# Patient Record
Sex: FEMALE | Race: WHITE | Employment: FULL TIME | URBAN - METROPOLITAN AREA
[De-identification: names, ages, dates, MRNs, and addresses within clinical notes are randomized per-mention and may not be internally consistent; named-entity substitution may affect disease eponyms.]

---

## 2017-11-05 ENCOUNTER — HOSPITAL ENCOUNTER (EMERGENCY)
Age: 27
Discharge: HOME | End: 2017-11-05
Attending: EMERGENCY MEDICINE
Payer: SUBSIDIZED

## 2017-11-05 VITALS
RESPIRATION RATE: 16 BRPM | WEIGHT: 199.3 LBS | HEART RATE: 104 BPM | HEIGHT: 62 IN | DIASTOLIC BLOOD PRESSURE: 66 MMHG | BODY MASS INDEX: 36.67 KG/M2 | SYSTOLIC BLOOD PRESSURE: 100 MMHG | OXYGEN SATURATION: 99 % | TEMPERATURE: 98.1 F

## 2017-11-05 DIAGNOSIS — N30.00 ACUTE CYSTITIS WITHOUT HEMATURIA: Primary | ICD-10-CM

## 2017-11-05 LAB
ALBUMIN SERPL-MCNC: 3.3 G/DL (ref 3.5–5)
ALBUMIN/GLOB SERPL: 1 {RATIO} (ref 1.1–2.2)
ALP SERPL-CCNC: 102 U/L (ref 45–117)
ALT SERPL-CCNC: 94 U/L (ref 12–78)
ANION GAP SERPL CALC-SCNC: 6 MMOL/L (ref 5–15)
APPEARANCE UR: ABNORMAL
AST SERPL-CCNC: 61 U/L (ref 15–37)
BACTERIA URNS QL MICRO: ABNORMAL /HPF
BASOPHILS # BLD: 0 K/UL (ref 0–0.1)
BASOPHILS NFR BLD: 0 % (ref 0–1)
BILIRUB SERPL-MCNC: 0.2 MG/DL (ref 0.2–1)
BILIRUB UR QL: NEGATIVE
BUN SERPL-MCNC: 12 MG/DL (ref 6–20)
BUN/CREAT SERPL: 16 (ref 12–20)
CALCIUM SERPL-MCNC: 7.7 MG/DL (ref 8.5–10.1)
CHLORIDE SERPL-SCNC: 107 MMOL/L (ref 97–108)
CO2 SERPL-SCNC: 25 MMOL/L (ref 21–32)
COLOR UR: ABNORMAL
CREAT SERPL-MCNC: 0.77 MG/DL (ref 0.55–1.02)
EOSINOPHIL # BLD: 0.2 K/UL (ref 0–0.4)
EOSINOPHIL NFR BLD: 3 % (ref 0–7)
EPITH CASTS URNS QL MICRO: ABNORMAL /LPF
ERYTHROCYTE [DISTWIDTH] IN BLOOD BY AUTOMATED COUNT: 14 % (ref 11.5–14.5)
GLOBULIN SER CALC-MCNC: 3.2 G/DL (ref 2–4)
GLUCOSE SERPL-MCNC: 88 MG/DL (ref 65–100)
GLUCOSE UR STRIP.AUTO-MCNC: NEGATIVE MG/DL
HCG UR QL: NEGATIVE
HCT VFR BLD AUTO: 39.7 % (ref 35–47)
HGB BLD-MCNC: 13.2 G/DL (ref 11.5–16)
HGB UR QL STRIP: ABNORMAL
KETONES UR QL STRIP.AUTO: NEGATIVE MG/DL
LEUKOCYTE ESTERASE UR QL STRIP.AUTO: ABNORMAL
LYMPHOCYTES # BLD: 0.3 K/UL (ref 0.8–3.5)
LYMPHOCYTES NFR BLD: 6 % (ref 12–49)
MCH RBC QN AUTO: 29.9 PG (ref 26–34)
MCHC RBC AUTO-ENTMCNC: 33.2 G/DL (ref 30–36.5)
MCV RBC AUTO: 90 FL (ref 80–99)
MONOCYTES # BLD: 0.5 K/UL (ref 0–1)
MONOCYTES NFR BLD: 8 % (ref 5–13)
NEUTS SEG # BLD: 4.8 K/UL (ref 1.8–8)
NEUTS SEG NFR BLD: 83 % (ref 32–75)
NITRITE UR QL STRIP.AUTO: NEGATIVE
PH UR STRIP: 6 [PH] (ref 5–8)
PLATELET # BLD AUTO: 186 K/UL (ref 150–400)
POTASSIUM SERPL-SCNC: 4.1 MMOL/L (ref 3.5–5.1)
PROT SERPL-MCNC: 6.5 G/DL (ref 6.4–8.2)
PROT UR STRIP-MCNC: NEGATIVE MG/DL
RBC # BLD AUTO: 4.41 M/UL (ref 3.8–5.2)
RBC #/AREA URNS HPF: ABNORMAL /HPF (ref 0–5)
RBC MORPH BLD: ABNORMAL
SODIUM SERPL-SCNC: 138 MMOL/L (ref 136–145)
SP GR UR REFRACTOMETRY: 1.02 (ref 1–1.03)
UA: UC IF INDICATED,UAUC: ABNORMAL
UROBILINOGEN UR QL STRIP.AUTO: 1 EU/DL (ref 0.2–1)
WBC # BLD AUTO: 5.8 K/UL (ref 3.6–11)
WBC URNS QL MICRO: ABNORMAL /HPF (ref 0–4)

## 2017-11-05 PROCEDURE — 80053 COMPREHEN METABOLIC PANEL: CPT | Performed by: EMERGENCY MEDICINE

## 2017-11-05 PROCEDURE — 99284 EMERGENCY DEPT VISIT MOD MDM: CPT

## 2017-11-05 PROCEDURE — 87086 URINE CULTURE/COLONY COUNT: CPT | Performed by: EMERGENCY MEDICINE

## 2017-11-05 PROCEDURE — 96361 HYDRATE IV INFUSION ADD-ON: CPT

## 2017-11-05 PROCEDURE — 85025 COMPLETE CBC W/AUTO DIFF WBC: CPT | Performed by: EMERGENCY MEDICINE

## 2017-11-05 PROCEDURE — 96375 TX/PRO/DX INJ NEW DRUG ADDON: CPT

## 2017-11-05 PROCEDURE — 36415 COLL VENOUS BLD VENIPUNCTURE: CPT | Performed by: EMERGENCY MEDICINE

## 2017-11-05 PROCEDURE — 74011000258 HC RX REV CODE- 258: Performed by: EMERGENCY MEDICINE

## 2017-11-05 PROCEDURE — 81025 URINE PREGNANCY TEST: CPT | Performed by: EMERGENCY MEDICINE

## 2017-11-05 PROCEDURE — 96365 THER/PROPH/DIAG IV INF INIT: CPT

## 2017-11-05 PROCEDURE — 74011250636 HC RX REV CODE- 250/636: Performed by: EMERGENCY MEDICINE

## 2017-11-05 PROCEDURE — 81001 URINALYSIS AUTO W/SCOPE: CPT | Performed by: EMERGENCY MEDICINE

## 2017-11-05 PROCEDURE — 74011250637 HC RX REV CODE- 250/637: Performed by: EMERGENCY MEDICINE

## 2017-11-05 RX ORDER — KETOROLAC TROMETHAMINE 30 MG/ML
15 INJECTION, SOLUTION INTRAMUSCULAR; INTRAVENOUS
Status: COMPLETED | OUTPATIENT
Start: 2017-11-05 | End: 2017-11-05

## 2017-11-05 RX ORDER — CLONIDINE HYDROCHLORIDE 0.1 MG/1
0.1 TABLET ORAL AS NEEDED
COMMUNITY
End: 2019-01-14 | Stop reason: SDUPTHER

## 2017-11-05 RX ORDER — LAMOTRIGINE 200 MG/1
300 TABLET ORAL DAILY
COMMUNITY
End: 2018-03-29 | Stop reason: SDUPTHER

## 2017-11-05 RX ORDER — PHENAZOPYRIDINE HYDROCHLORIDE 100 MG/1
200 TABLET, FILM COATED ORAL
Status: COMPLETED | OUTPATIENT
Start: 2017-11-05 | End: 2017-11-05

## 2017-11-05 RX ORDER — ONDANSETRON 2 MG/ML
4 INJECTION INTRAMUSCULAR; INTRAVENOUS
Status: COMPLETED | OUTPATIENT
Start: 2017-11-05 | End: 2017-11-05

## 2017-11-05 RX ORDER — CEPHALEXIN 500 MG/1
500 CAPSULE ORAL 3 TIMES DAILY
Qty: 21 CAP | Refills: 0 | Status: SHIPPED | OUTPATIENT
Start: 2017-11-05 | End: 2017-11-12

## 2017-11-05 RX ADMIN — SODIUM CHLORIDE 1000 ML: 900 INJECTION, SOLUTION INTRAVENOUS at 10:40

## 2017-11-05 RX ADMIN — SODIUM CHLORIDE 1000 ML: 900 INJECTION, SOLUTION INTRAVENOUS at 11:35

## 2017-11-05 RX ADMIN — PHENAZOPYRIDINE HYDROCHLORIDE 200 MG: 100 TABLET ORAL at 10:46

## 2017-11-05 RX ADMIN — KETOROLAC TROMETHAMINE 15 MG: 30 INJECTION, SOLUTION INTRAMUSCULAR at 10:47

## 2017-11-05 RX ADMIN — ONDANSETRON HYDROCHLORIDE 4 MG: 2 INJECTION, SOLUTION INTRAMUSCULAR; INTRAVENOUS at 11:15

## 2017-11-05 RX ADMIN — CEFTRIAXONE 1 G: 1 INJECTION, POWDER, FOR SOLUTION INTRAMUSCULAR; INTRAVENOUS at 11:46

## 2017-11-05 NOTE — DISCHARGE INSTRUCTIONS

## 2017-11-05 NOTE — ED NOTES
Bedside shift change report given to [address] (oncoming nurse) by Rod Persaud RN (offgoing nurse). Report included the following information SBAR, ED Summary and MAR.

## 2017-11-05 NOTE — ED NOTES
Patient received discharge instructions and prescriptions from MD; KOBI; neuro intact; received work note x 2 days; ambulated from ED under own power accompanied by family; declined wheelchair assistance to [employer]

## 2017-11-05 NOTE — ED PROVIDER NOTES
[facility].  EMERGENCY DEPARTMENT HISTORY AND PHYSICAL EXAM       Date of Service: 11/5/2017   Patient Name: Lilli Young   YOB: 1990  Medical Record Number: [id number]    History of Presenting Illness     Chief Complaint   Patient presents with    Urinary Pain     pt treated for UTI since Thursday on Bactrim without relief of symptoms, pt reports feeling worse        History Provided By:  patient    Additional History:   Lilli Young is a 28 y.o. female with PMhx significant for CKD, kidney stones, and UTI who presents w/ mother to the ED with cc of 5/10 constant aching lower abdominal and back pain. Pt additionally complains of reoccurring fever, nausea, vomiting, urinary frequency, chills, diarrhea, and generalized body aches since onset of pain. Pt reports she was treated for a UTI on Thursday (11/2/17) at [facility] and was prescribed a short course of Bactrim of which she took last night (11/4/17) without any relief of pain and symptoms. She denies any chance of pregnancy and her LMP was on 10/2/17. Pt denies dysuria, hematuria, vaginal discharge, lightheadedness, dizziness, chest pain, dyspnea. Social Hx: - Tobacco (former), - EtOH, - Illicit Drugs    There are no other complaints, changes or physical findings at this time. Primary Care Provider: Carmel Madera MD     Past History     Past Medical History:   Past Medical History:   Diagnosis Date    Chronic kidney disease     UTI, kidney stones        Past Surgical History:   History reviewed. No pertinent surgical history. Family History:   History reviewed. No pertinent family history. Social History:   Social History   Substance Use Topics    Smoking status: Former Smoker     Quit date: 12/1/2012    Smokeless tobacco: Never Used    Alcohol use No        Allergies:   No Known Allergies      Review of Systems   Review of Systems   Constitutional: Positive for chills and fever. Negative for fatigue. HENT: Negative for congestion, rhinorrhea and sore throat. Eyes: Negative for pain, discharge and visual disturbance. Respiratory: Negative for cough, chest tightness, shortness of breath and wheezing. Cardiovascular: Negative for chest pain, palpitations and leg swelling. Gastrointestinal: Positive for abdominal pain (lower), diarrhea, nausea and vomiting. Negative for constipation. Genitourinary: Positive for frequency. Negative for dysuria, hematuria and vaginal discharge. Musculoskeletal: Positive for back pain (lower) and myalgias (generalized). Negative for arthralgias. Skin: Negative for rash. Neurological: Negative for dizziness, weakness, light-headedness and headaches. Psychiatric/Behavioral: Negative. Physical Exam  Physical Exam   Constitutional: She is oriented to person, place, and time. She appears well-developed and well-nourished. No distress. HENT:   Head: Normocephalic and atraumatic. Eyes: EOM are normal. Right eye exhibits no discharge. Left eye exhibits no discharge. No scleral icterus. Neck: Normal range of motion. Neck supple. No tracheal deviation present. Cardiovascular: Normal rate, regular rhythm, normal heart sounds and intact distal pulses. Exam reveals no gallop and no friction rub. No murmur heard. Pulmonary/Chest: Effort normal and breath sounds normal. No respiratory distress. She has no wheezes. She has no rales. Abdominal: Soft. She exhibits no distension. There is no rebound and no guarding. Suprapubic tenderness. BL flank tenderness. Musculoskeletal: Normal range of motion. She exhibits no edema. Lymphadenopathy:     She has no cervical adenopathy. Neurological: She is alert and oriented to person, place, and time. No focal neuro deficits   Skin: Skin is warm and dry. No rash noted. Psychiatric: She has a normal mood and affect. Nursing note and vitals reviewed.       Medical Decision Making   I am the first provider for this patient. I reviewed the vital signs, available nursing notes, past medical history, past surgical history, family history and social history. Provider Notes:   Patient presents to ED with persistent s/s of UTI despite antibiotics. She is afebrile, well appearing. Differential includes UTI, pyelonephritis, dehydration, pregnancy. No s/s of STI/PID. Doubt nephrolithiasis given exam.  Doubt acute intraabdominal process including appendicitis. - CBC, CMP, UA, UPT  - IVF, symptomatic management and reevaluate    ED Course:  10:03 PM   Initial assessment performed. The patients presenting problems have been discussed, and they are in agreement with the care plan formulated and outlined with them. I have encouraged them to ask questions as they arise throughout their visit. Progress Notes:   1:19 PM  Pt states she feels better. She ambulated and was asymptomatic denying dizziness and lightheadedness. She remains afebrile (97.7F). UTI treated with ceftriaxone and keflex. Advised close follow up. Discussed results, prescriptions and follow up plan with patient. Provided customary return to ED instructions. Patient expressed understanding.   Arianna James MD      Diagnostic Study Results     Labs -      Recent Results (from the past 12 hour(s))   URINALYSIS W/ REFLEX CULTURE    Collection Time: 11/05/17 10:06 AM   Result Value Ref Range    Color YELLOW/STRAW      Appearance TURBID (A) CLEAR      Specific gravity 1.023 1.003 - 1.030      pH (UA) 6.0 5.0 - 8.0      Protein NEGATIVE  NEG mg/dL    Glucose NEGATIVE  NEG mg/dL    Ketone NEGATIVE  NEG mg/dL    Bilirubin NEGATIVE  NEG      Blood MODERATE (A) NEG      Urobilinogen 1.0 0.2 - 1.0 EU/dL    Nitrites NEGATIVE  NEG      Leukocyte Esterase SMALL (A) NEG      WBC 5-10 0 - 4 /hpf    RBC 5-10 0 - 5 /hpf    Epithelial cells MANY (A) FEW /lpf    Bacteria 4+ (A) NEG /hpf    UA:UC IF INDICATED URINE CULTURE ORDERED (A) CNI HCG URINE, QL    Collection Time: 11/05/17 10:06 AM   Result Value Ref Range    HCG urine, Ql. NEGATIVE  NEG     CBC WITH AUTOMATED DIFF    Collection Time: 11/05/17 10:35 AM   Result Value Ref Range    WBC 5.8 3.6 - 11.0 K/uL    RBC 4.41 3.80 - 5.20 M/uL    HGB 13.2 11.5 - 16.0 g/dL    HCT 39.7 35.0 - 47.0 %    MCV 90.0 80.0 - 99.0 FL    MCH 29.9 26.0 - 34.0 PG    MCHC 33.2 30.0 - 36.5 g/dL    RDW 14.0 11.5 - 14.5 %    PLATELET 896 530 - 316 K/uL    NEUTROPHILS 83 (H) 32 - 75 %    LYMPHOCYTES 6 (L) 12 - 49 %    MONOCYTES 8 5 - 13 %    EOSINOPHILS 3 0 - 7 %    BASOPHILS 0 0 - 1 %    ABS. NEUTROPHILS 4.8 1.8 - 8.0 K/UL    ABS. LYMPHOCYTES 0.3 (L) 0.8 - 3.5 K/UL    ABS. MONOCYTES 0.5 0.0 - 1.0 K/UL    ABS. EOSINOPHILS 0.2 0.0 - 0.4 K/UL    ABS. BASOPHILS 0.0 0.0 - 0.1 K/UL    RBC COMMENTS NORMOCYTIC, NORMOCHROMIC     METABOLIC PANEL, COMPREHENSIVE    Collection Time: 11/05/17 10:35 AM   Result Value Ref Range    Sodium 138 136 - 145 mmol/L    Potassium 4.1 3.5 - 5.1 mmol/L    Chloride 107 97 - 108 mmol/L    CO2 25 21 - 32 mmol/L    Anion gap 6 5 - 15 mmol/L    Glucose 88 65 - 100 mg/dL    BUN 12 6 - 20 MG/DL    Creatinine 0.77 0.55 - 1.02 MG/DL    BUN/Creatinine ratio 16 12 - 20      GFR est AA >60 >60 ml/min/1.73m2    GFR est non-AA >60 >60 ml/min/1.73m2    Calcium 7.7 (L) 8.5 - 10.1 MG/DL    Bilirubin, total 0.2 0.2 - 1.0 MG/DL    ALT (SGPT) 94 (H) 12 - 78 U/L    AST (SGOT) 61 (H) 15 - 37 U/L    Alk. phosphatase 102 45 - 117 U/L    Protein, total 6.5 6.4 - 8.2 g/dL    Albumin 3.3 (L) 3.5 - 5.0 g/dL    Globulin 3.2 2.0 - 4.0 g/dL    A-G Ratio 1.0 (L) 1.1 - 2.2       Vital Signs-Reviewed the patient's vital signs.    Patient Vitals for the past 12 hrs:   Temp Pulse Resp BP SpO2   11/05/17 1250 - - - 96/58 -   11/05/17 1230 - - - 98/59 99 %   11/05/17 1200 - - - 96/68 100 %   11/05/17 1145 - - - (!) 88/57 99 %   11/05/17 1115 - - - (!) 89/63 99 %   11/05/17 1055 - - - - 100 %   11/05/17 1045 - - - 106/62 99 % 11/05/17 1030 - - - 97/70 98 %   11/05/17 1018 - - - - 99 %   11/05/17 1004 - - - - 100 %   11/05/17 0957 - - - 128/71 -   11/05/17 0935 98.1 °F (36.7 °C) (!) 104 16 127/75 99 %       Medications Given in the ED:  Medications   sodium chloride 0.9 % bolus infusion 1,000 mL (0 mL IntraVENous IV Completed 11/5/17 1136)   ketorolac (TORADOL) injection 15 mg (15 mg IntraVENous Given 11/5/17 1047)   phenazopyridine (PYRIDIUM) tablet 200 mg (200 mg Oral Given 11/5/17 1046)   ondansetron (ZOFRAN) injection 4 mg (4 mg IntraVENous Given 11/5/17 1115)   sodium chloride 0.9 % bolus infusion 1,000 mL (0 mL IntraVENous IV Completed 11/5/17 1218)   cefTRIAXone (ROCEPHIN) 1 g in 0.9% sodium chloride (MBP/ADV) 50 mL (0 g IntraVENous IV Completed 11/5/17 1216)     Diagnosis   Clinical Impression:   1. Acute cystitis without hematuria         Plan:  1:   Follow-up Information     Follow up With Details Comments Contact Info    Mere Tapia MD In 2 days  [address] [place] [address] [address] [phone]      [facility]  As needed, If symptoms worsen [address]  [address] [facility]  [phone]        2:   Current Discharge Medication List      START taking these medications    Details   cephALEXin (KEFLEX) 500 mg capsule Take 1 Cap by mouth three (3) times daily for 7 days. Qty: 21 Cap, Refills: 0           Return to ED if Worse    Discharge Note:  1:17 PM  The pt is ready for discharge. The pt's signs, symptoms, diagnosis, and discharge instructions have been discussed and pt has conveyed their understanding. The pt is to follow up as recommended or return to ER should their symptoms worsen. Plan has been discussed and pt is in agreement.  _______________________________   Attestations:      This note is prepared by [employer], acting as Scribe for MD Ronaldo Osbornves, MD: The scribe's documentation has been prepared under my direction and personally reviewed by me in its entirety.  I confirm that the note above accurately reflects all work, treatment, procedures, and medical decision making performed by me.    _______________________________

## 2017-11-05 NOTE — ED NOTES
Pt [facility] and pain on [facility]ination, and was diagnosed with bladder infection and given bactrim. Symptoms have not subsided and has gotten worse. Pt started vomiting on Friday and generalized body aches, hurts for her to move neck. She reports she received the flu shot end of September. Pt reports last time she vomited was yesterday am. Pt has not been eating a lot.  Reports diarrhea yesterday am.

## 2017-11-05 NOTE — LETTER
[facility] EMERGENCY DEPT 
[address] [place] [address] [address] [id number] 
[phone] Work/School Note Date: 11/5/2017 To Whom It May concern: 
 
Vitor Mcdermott was seen and treated today in the emergency room by the following provider(s): 
Attending Provider: Meg Shearer MD.   
 
Vitor Mcdermott may return to work on 11/08/2017.  
 
Sincerely, 
 
 
 
 
Philippe Juárez RN

## 2017-11-06 LAB
BACTERIA SPEC CULT: ABNORMAL
CC UR VC: ABNORMAL
SERVICE CMNT-IMP: ABNORMAL

## 2017-11-09 ENCOUNTER — HOSPITAL ENCOUNTER (EMERGENCY)
Age: 27
Discharge: HOME | End: 2017-11-09
Attending: EMERGENCY MEDICINE
Payer: SUBSIDIZED

## 2017-11-09 VITALS
TEMPERATURE: 98.5 F | SYSTOLIC BLOOD PRESSURE: 121 MMHG | BODY MASS INDEX: 35.86 KG/M2 | HEIGHT: 62 IN | RESPIRATION RATE: 18 BRPM | WEIGHT: 194.89 LBS | OXYGEN SATURATION: 100 % | DIASTOLIC BLOOD PRESSURE: 82 MMHG | HEART RATE: 89 BPM

## 2017-11-09 DIAGNOSIS — R30.0 DYSURIA: Primary | ICD-10-CM

## 2017-11-09 DIAGNOSIS — R52 BODY ACHES: ICD-10-CM

## 2017-11-09 LAB
APPEARANCE UR: CLEAR
BACTERIA URNS QL MICRO: NEGATIVE /HPF
BILIRUB UR QL: NEGATIVE
COLOR UR: NORMAL
EPITH CASTS URNS QL MICRO: NORMAL /LPF
GLUCOSE UR STRIP.AUTO-MCNC: NEGATIVE MG/DL
HCG UR QL: NEGATIVE
HGB UR QL STRIP: NEGATIVE
HYALINE CASTS URNS QL MICRO: NORMAL /LPF (ref 0–5)
KETONES UR QL STRIP.AUTO: NEGATIVE MG/DL
LEUKOCYTE ESTERASE UR QL STRIP.AUTO: NEGATIVE
NITRITE UR QL STRIP.AUTO: NEGATIVE
PH UR STRIP: 5.5 [PH] (ref 5–8)
PROT UR STRIP-MCNC: NEGATIVE MG/DL
RBC #/AREA URNS HPF: NORMAL /HPF (ref 0–5)
SP GR UR REFRACTOMETRY: 1.02 (ref 1–1.03)
UA: UC IF INDICATED,UAUC: NORMAL
UROBILINOGEN UR QL STRIP.AUTO: 0.2 EU/DL (ref 0.2–1)
WBC URNS QL MICRO: NORMAL /HPF (ref 0–4)

## 2017-11-09 PROCEDURE — 81025 URINE PREGNANCY TEST: CPT | Performed by: EMERGENCY MEDICINE

## 2017-11-09 PROCEDURE — 99283 EMERGENCY DEPT VISIT LOW MDM: CPT

## 2017-11-09 PROCEDURE — 81001 URINALYSIS AUTO W/SCOPE: CPT | Performed by: EMERGENCY MEDICINE

## 2017-11-09 RX ORDER — PHENAZOPYRIDINE HYDROCHLORIDE 200 MG/1
200 TABLET, FILM COATED ORAL 3 TIMES DAILY
Qty: 6 TAB | Refills: 0 | Status: SHIPPED | OUTPATIENT
Start: 2017-11-09 | End: 2017-11-11

## 2017-11-09 RX ORDER — IBUPROFEN 800 MG/1
800 TABLET ORAL
Qty: 20 TAB | Refills: 0 | Status: SHIPPED | OUTPATIENT
Start: 2017-11-09 | End: 2017-11-16

## 2017-11-09 RX ORDER — HYDROCODONE BITARTRATE AND ACETAMINOPHEN 5; 325 MG/1; MG/1
1 TABLET ORAL
Qty: 10 TAB | Refills: 0 | Status: SHIPPED | OUTPATIENT
Start: 2017-11-09 | End: 2018-03-29

## 2017-11-09 NOTE — LETTER
[address] 
[facility] EMERGENCY DEPT 
[address] [place] [address] [address] [id number] [phone] Work/School Note Date: 11/9/2017 To Whom It May concern: 
 
Will Rodney was seen and treated today in the emergency room by the following provider(s): 
Attending Provider: Clara Ponce MD 
Physician Assistant: HDRUV Flynn. Will Rodney may return to work on [id number]. Sincerely, DHRUV Flynn

## 2017-11-10 NOTE — ED NOTES
All discharge paperwork reviewed with patient and MD and she denies any further need for explanation regarding these instructions or need for wheelchair and ambulates out of ED

## 2017-11-10 NOTE — ED PROVIDER NOTES
[facility]  EMERGENCY DEPARTMENT HISTORY AND PHYSICAL EXAM         Date of Service: 11/9/2017   Patient Name: Khalif Galdamez   YOB: 1990  Medical Record Number: [id number]    History of Presenting Illness     Chief Complaint   Patient presents with    Urinary Frequency     with burning & feeling bad since seen on Sunday        History Provided By:  patient    Additional History:   Khalif Galdamez is a 28 y.o. female with PMhx significant for CKD who presents ambulatory to the ED with cc of ongoing dysuria, urinary frequency, body aches, and headache x 1 week. Per chart review, pt was seen at [facility] ED on 11/5 and dx with a UTI. She states she was discharged home with Keflex and Pyridium, which she endorses taking wnr of sx's. She states she received the influenza vaccine this year and specifically denies any cough, congestion, and sore throat. Social Hx: - Tobacco, - EtOH, - Illicit Drugs    There are no other complaints, changes or physical findings at this time. Primary Care Provider: Vicenta Adhikari MD     Past History     Past Medical History:   Past Medical History:   Diagnosis Date    Chronic kidney disease     UTI, kidney stones        Past Surgical History:   No past surgical history on file. Family History:   No family history on file. Social History:   Social History   Substance Use Topics    Smoking status: Former Smoker     Quit date: 12/1/2012    Smokeless tobacco: Never Used    Alcohol use No        Allergies:   No Known Allergies     Review of Systems   Review of Systems   Constitutional: Negative for fatigue and fever. HENT: Negative for congestion, ear pain and sore throat. Eyes: Negative for pain, redness and visual disturbance. Respiratory: Negative for cough and shortness of breath. Cardiovascular: Negative for chest pain and palpitations. Gastrointestinal: Negative for abdominal pain, nausea and vomiting. Genitourinary: Positive for dysuria and frequency. Negative for urgency. Musculoskeletal: Negative for back pain, gait problem, neck pain and neck stiffness. + body aches   Skin: Negative for rash and wound. Neurological: Positive for headaches. Negative for dizziness, weakness, light-headedness and numbness. Physical Exam  Physical Exam   Constitutional: She is oriented to person, place, and time. She appears well-developed and well-nourished. Non-toxic appearance. No distress. HENT:   Head: Normocephalic and atraumatic. Right Ear: External ear normal.   Left Ear: External ear normal.   Nose: Nose normal.   Mouth/Throat: Uvula is midline. No trismus in the jaw. Eyes: Conjunctivae and EOM are normal. Pupils are equal, round, and reactive to light. No scleral icterus. Neck: Normal range of motion and full passive range of motion without pain. Cardiovascular: Normal rate and regular rhythm. Pulmonary/Chest: Effort normal. No accessory muscle usage. No tachypnea. No respiratory distress. She has no decreased breath sounds. She has no wheezes. Abdominal: Soft. There is no tenderness. Musculoskeletal: Normal range of motion. Neurological: She is alert and oriented to person, place, and time. She is not disoriented. No cranial nerve deficit. GCS eye subscore is 4. GCS verbal subscore is 5. GCS motor subscore is 6. Skin: Skin is intact. No rash noted. Psychiatric: She has a normal mood and affect. Her speech is normal.   Nursing note and vitals reviewed. Medical Decision Making   I am the first provider for this patient. I reviewed the vital signs, available nursing notes, past medical history, past surgical history, family history and social history. Provider Notes:     DDx: UTI, pyelonephritis      ED Course:  10:05 PM   Initial assessment performed.  The patients presenting problems have been discussed, and they are in agreement with the care plan formulated and outlined with them. I have encouraged them to ask questions as they arise throughout their visit. Diagnostic Study Results   Labs -      Recent Results (from the past 12 hour(s))   URINALYSIS W/ REFLEX CULTURE    Collection Time: 11/09/17  8:56 PM   Result Value Ref Range    Color YELLOW/STRAW      Appearance CLEAR CLEAR      Specific gravity 1.020 1.003 - 1.030      pH (UA) 5.5 5.0 - 8.0      Protein NEGATIVE  NEG mg/dL    Glucose NEGATIVE  NEG mg/dL    Ketone NEGATIVE  NEG mg/dL    Bilirubin NEGATIVE  NEG      Blood NEGATIVE  NEG      Urobilinogen 0.2 0.2 - 1.0 EU/dL    Nitrites NEGATIVE  NEG      Leukocyte Esterase NEGATIVE  NEG      WBC 0-4 0 - 4 /hpf    RBC 0-5 0 - 5 /hpf    Epithelial cells FEW FEW /lpf    Bacteria NEGATIVE  NEG /hpf    UA:UC IF INDICATED CULTURE NOT INDICATED BY UA RESULT CNI      Hyaline cast 0-2 0 - 5 /lpf   HCG URINE, QL    Collection Time: 11/09/17  8:56 PM   Result Value Ref Range    HCG urine, Ql. NEGATIVE  NEG         Vital Signs-Reviewed the patient's vital signs. Patient Vitals for the past 12 hrs:   Temp Pulse Resp BP SpO2   11/09/17 2041 98.5 °F (36.9 °C) 89 18 121/82 100 %       Diagnosis:  Clinical Impression:   1. Dysuria    2. Body aches         Plan:  1: Discharge home  Follow-up Information     Follow up With Details Comments [address] [facility] Rupert Rojas MD Schedule an appointment as soon as possible for a visit PRIMARY CARE: call to schedule follow up [address] [place] [address] [phone]      Lucia Barger MD Schedule an appointment as soon as possible for a visit UROLOGY: for any concerns [address] [phone]            2:   Discharge Medication List as of 11/9/2017 10:20 PM      START taking these medications    Details   ibuprofen (MOTRIN) 800 mg tablet Take 1 Tab by mouth every eight (8) hours as needed for Pain for up to 7 days. , Normal, Disp-20 Tab, R-0      phenazopyridine (PYRIDIUM) 200 mg tablet Take 1 Tab by mouth three (3) times daily for 2 days. , Normal, Disp-6 Tab, R-0      HYDROcodone-acetaminophen (NORCO) 5-325 mg per tablet Take 1 Tab by mouth every four (4) hours as needed for Pain. Max Daily Amount: 6 Tabs., Print, Disp-10 Tab, R-0         CONTINUE these medications which have NOT CHANGED    Details   cloNIDine HCl (CATAPRES) 0.1 mg tablet Take 0.1 mg by mouth as needed. Indications: anxiety, Historical Med      lamoTRIgine (LAMICTAL) 200 mg tablet Take 300 mg by mouth daily. , Historical Med      cephALEXin (KEFLEX) 500 mg capsule Take 1 Cap by mouth three (3) times daily for 7 days. , Normal, Disp-21 Cap, R-0           Return to ED if worse. Disposition:    DISCHARGE NOTE:  10:21 PM  The patient is ready for discharge. The patients signs, symptoms, diagnosis, and instructions for discharge have been discussed and the pt has conveyed their understanding. The patient is to follow up as recommended with PCP or return to the ER should their symptoms worsen. Plan has been discussed and patient has conveyed their agreement.      _______________________________   Attestations: This note is prepared by Suzanne Luu, acting as Scribe for [employer]. PA-C Ephraim Homans: The scribe's documentation has been prepared under my direction and personally reviewed by me in its entirety.  I confirm that the note above accurately reflects all work, treatment, procedures, and medical decision making performed by me.      _______________________________

## 2017-11-10 NOTE — DISCHARGE INSTRUCTIONS
Thank you for allowing us to provide you with care today. We hope we addressed all of your concerns and needs. We strive to provide excellent quality care in the Emergency Department. Please rate us as excellent, as anything less than excellent does not meet our expectations. If you feel that you have not received excellent quality care or timely care, please ask to speak to the nurse manager. Please choose us in the future for your continued health care needs. The exam and treatment you received in the Emergency Department were for an urgent problem and are not intended as complete care. It is important that you follow-up with a doctor, nurse practitioner, or  [id number] assistant to: (1) confirm your diagnosis, (2) re-evaluation of changes in your illness and treatment, and (3) for ongoing care. If your symptoms become worse or you do not improve as expected and you are unable to reach your usual health care provider, you should return to the Emergency Department. We are available 24 hours a day. Take this sheet with you when you go to your follow-up visit. If you have any problem arranging the follow-up visit, contact the Emergency Department immediately. Make an appointment with your Primary Care doctor for follow up of this visit. Return to the ER if you are unable to be seen in the time recommended on your discharge instructions.

## 2018-03-29 ENCOUNTER — OFFICE VISIT (OUTPATIENT)
Dept: BEHAVIORAL/MENTAL HEALTH CLINIC | Age: 28
End: 2018-03-29

## 2018-03-29 VITALS
HEART RATE: 88 BPM | SYSTOLIC BLOOD PRESSURE: 90 MMHG | BODY MASS INDEX: 36.36 KG/M2 | HEIGHT: 62 IN | DIASTOLIC BLOOD PRESSURE: 56 MMHG | WEIGHT: 197.6 LBS

## 2018-03-29 DIAGNOSIS — F33.41 RECURRENT MAJOR DEPRESSIVE DISORDER, IN PARTIAL REMISSION (HCC): Primary | ICD-10-CM

## 2018-03-29 DIAGNOSIS — F10.21 ALCOHOL DEPENDENCE IN REMISSION (HCC): ICD-10-CM

## 2018-03-29 DIAGNOSIS — Z86.59 HISTORY OF BIPOLAR DISORDER: ICD-10-CM

## 2018-03-29 DIAGNOSIS — F12.10 CANNABIS ABUSE: ICD-10-CM

## 2018-03-29 DIAGNOSIS — F41.1 GENERALIZED ANXIETY DISORDER: ICD-10-CM

## 2018-03-29 DIAGNOSIS — Z86.59 HISTORY OF ADHD: ICD-10-CM

## 2018-03-29 RX ORDER — ZOLPIDEM TARTRATE 10 MG/1
TABLET ORAL
COMMUNITY
End: 2018-05-21

## 2018-03-29 RX ORDER — LAMOTRIGINE 150 MG/1
300 TABLET ORAL DAILY
Qty: 180 TAB | Refills: 2 | Status: SHIPPED | OUTPATIENT
Start: 2018-03-29 | End: 2018-06-29 | Stop reason: SDUPTHER

## 2018-03-29 NOTE — MR AVS SNAPSHOT
[address] [place] [id number] Patient: Nav Allen MRN: [id number] BTE:[id number] Visit Information Date & Time Provider Department Dept. Phone Encounter #  
 3/29/2018  9:00 AM Wolfgang Torres MD [facility] [phone] [id number] Upcoming Health Maintenance Date Due  
 PAP AKA CERVICAL CYTOLOGY 2/25/2010 Influenza Age 5 to Adult 8/1/2017 DTaP/Tdap/Td series (2 - Td) 8/23/2023 Allergies as of 3/29/2018  Review Complete On: 3/29/2018 By: Germania Dias No Known Allergies Current Immunizations  Never Reviewed Name Date Tdap 8/23/2013  9:26 PM  
  
 Not reviewed this visit Vitals BP Pulse Height(growth percentile) Weight(growth percentile) BMI Smoking Status 90/56 88 5' 2\" (1.575 m) 197 lb 9.6 oz (89.6 kg) 36.14 kg/m2 Former Smoker BMI and BSA Data Body Mass Index Body Surface Area  
 36.14 kg/m 2 1.98 m 2 Preferred Pharmacy Pharmacy Name Phone [facility] [address] , [address] [place] [address] Dylan [phone] Your Updated Medication List  
  
   
This list is accurate as of 3/29/18  9:28 AM.  Always use your most recent med list.  
  
  
  
  
 AMBIEN 10 mg tablet Generic drug:  zolpidem Take  by mouth nightly as needed for Sleep.  
  
 cloNIDine HCl 0.1 mg tablet Commonly known as:  CATAPRES Take 0.1 mg by mouth as needed. Indications: anxiety  
  
 lamoTRIgine 150 mg tablet Commonly known as: LaMICtal  
Take 2 Tabs by mouth daily. Indications: BIPOLAR DISORDER IN REMISSION Prescriptions Sent to Pharmacy Refills  
 lamoTRIgine (LAMICTAL) 150 mg tablet 2 Sig: Take 2 Tabs by mouth daily. Indications: BIPOLAR DISORDER IN REMISSION Class: Normal  
 Pharmacy: [facility] [address] , [facility] Lorena Richardson Ph #: [phone] Route: Oral  
  
Introducing [facility]! [employer] introduces judo patient portal. Now you can access parts of your medical record, email your doctor's office, and request medication refills online. 1. In your internet browser, go to [email or web address] 2. Click on the First Time User? Click Here link in the Sign In box. You will see the New Member Sign Up page. 3. Enter your judo Access Code exactly as it appears below. You will not need to use this code after youve completed the sign-up process. If you do not sign up before the expiration date, you must request a new code. · judo Access Code: [email or web address] Expires: 6/27/2018  9:21 AM 
 
4. Enter the last four digits of your Social Security Number (xxxx) and Date of Birth (mm/dd/yyyy) as indicated and click Submit. You will be taken to the next sign-up page. 5. Create a judo ID. This will be your judo login ID and cannot be changed, so think of one that is secure and easy to remember. 6. Create a judo password. You can change your password at any time. 7. Enter your Password Reset Question and Answer. This can be used at a later time if you forget your password. 8. Enter your e-mail address. You will receive e-mail notification when new information is available in [address] 9. Click Sign Up. You can now view and download portions of your medical record. 10. Click the Download Summary menu link to download a portable copy of your medical information. If you have questions, please visit the Frequently Asked Questions section of the judo website. Remember, judo is NOT to be used for urgent needs. For medical emergencies, dial 911. Now available from your iPhone and Android! Please provide this summary of care documentation to your next provider. Your primary care clinician is listed as Sandrita Martinez.  If you have any questions after today's visit, please call [phone].
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
## 2018-05-21 ENCOUNTER — HOSPITAL ENCOUNTER (EMERGENCY)
Age: 28
Discharge: HOME | End: 2018-05-21
Attending: EMERGENCY MEDICINE
Payer: SUBSIDIZED

## 2018-05-21 ENCOUNTER — APPOINTMENT (OUTPATIENT)
Dept: CT IMAGING | Age: 28
End: 2018-05-21
Attending: PHYSICIAN ASSISTANT
Payer: SUBSIDIZED

## 2018-05-21 VITALS
HEIGHT: 62 IN | OXYGEN SATURATION: 97 % | TEMPERATURE: 98.3 F | BODY MASS INDEX: 35.99 KG/M2 | WEIGHT: 195.55 LBS | DIASTOLIC BLOOD PRESSURE: 72 MMHG | HEART RATE: 65 BPM | RESPIRATION RATE: 16 BRPM | SYSTOLIC BLOOD PRESSURE: 107 MMHG

## 2018-05-21 DIAGNOSIS — N20.0 KIDNEY STONE: Primary | ICD-10-CM

## 2018-05-21 DIAGNOSIS — R11.2 NON-INTRACTABLE VOMITING WITH NAUSEA, UNSPECIFIED VOMITING TYPE: ICD-10-CM

## 2018-05-21 DIAGNOSIS — R10.9 FLANK PAIN: ICD-10-CM

## 2018-05-21 DIAGNOSIS — R31.9 HEMATURIA, UNSPECIFIED TYPE: ICD-10-CM

## 2018-05-21 LAB
ALBUMIN SERPL-MCNC: 4 G/DL (ref 3.5–5)
ALBUMIN/GLOB SERPL: 1.2 {RATIO} (ref 1.1–2.2)
ALP SERPL-CCNC: 66 U/L (ref 45–117)
ALT SERPL-CCNC: 30 U/L (ref 12–78)
ANION GAP SERPL CALC-SCNC: 7 MMOL/L (ref 5–15)
APPEARANCE UR: ABNORMAL
AST SERPL-CCNC: 16 U/L (ref 15–37)
BACTERIA URNS QL MICRO: ABNORMAL /HPF
BILIRUB SERPL-MCNC: 0.3 MG/DL (ref 0.2–1)
BILIRUB UR QL: NEGATIVE
BUN SERPL-MCNC: 18 MG/DL (ref 6–20)
BUN/CREAT SERPL: 20 (ref 12–20)
CALCIUM SERPL-MCNC: 9.2 MG/DL (ref 8.5–10.1)
CHLORIDE SERPL-SCNC: 105 MMOL/L (ref 97–108)
CO2 SERPL-SCNC: 27 MMOL/L (ref 21–32)
COLOR UR: ABNORMAL
CREAT SERPL-MCNC: 0.88 MG/DL (ref 0.55–1.02)
EPITH CASTS URNS QL MICRO: ABNORMAL /LPF
ERYTHROCYTE [DISTWIDTH] IN BLOOD BY AUTOMATED COUNT: 12.6 % (ref 11.5–14.5)
GLOBULIN SER CALC-MCNC: 3.3 G/DL (ref 2–4)
GLUCOSE SERPL-MCNC: 101 MG/DL (ref 65–100)
GLUCOSE UR STRIP.AUTO-MCNC: NEGATIVE MG/DL
HCG UR QL: NEGATIVE
HCT VFR BLD AUTO: 39.8 % (ref 35–47)
HGB BLD-MCNC: 13.2 G/DL (ref 11.5–16)
HGB UR QL STRIP: ABNORMAL
KETONES UR QL STRIP.AUTO: NEGATIVE MG/DL
LEUKOCYTE ESTERASE UR QL STRIP.AUTO: NEGATIVE
MCH RBC QN AUTO: 30.4 PG (ref 26–34)
MCHC RBC AUTO-ENTMCNC: 33.2 G/DL (ref 30–36.5)
MCV RBC AUTO: 91.7 FL (ref 80–99)
MUCOUS THREADS URNS QL MICRO: ABNORMAL /LPF
NITRITE UR QL STRIP.AUTO: NEGATIVE
NRBC # BLD: 0 K/UL (ref 0–0.01)
NRBC BLD-RTO: 0 PER 100 WBC
PH UR STRIP: 5.5 [PH] (ref 5–8)
PLATELET # BLD AUTO: 274 K/UL (ref 150–400)
PMV BLD AUTO: 9.6 FL (ref 8.9–12.9)
POTASSIUM SERPL-SCNC: 3.5 MMOL/L (ref 3.5–5.1)
PROT SERPL-MCNC: 7.3 G/DL (ref 6.4–8.2)
PROT UR STRIP-MCNC: NEGATIVE MG/DL
RBC # BLD AUTO: 4.34 M/UL (ref 3.8–5.2)
RBC #/AREA URNS HPF: ABNORMAL /HPF (ref 0–5)
SODIUM SERPL-SCNC: 139 MMOL/L (ref 136–145)
SP GR UR REFRACTOMETRY: 1.02 (ref 1–1.03)
UA: UC IF INDICATED,UAUC: ABNORMAL
UROBILINOGEN UR QL STRIP.AUTO: 0.2 EU/DL (ref 0.2–1)
WBC # BLD AUTO: 9.4 K/UL (ref 3.6–11)
WBC URNS QL MICRO: ABNORMAL /HPF (ref 0–4)

## 2018-05-21 PROCEDURE — 87086 URINE CULTURE/COLONY COUNT: CPT

## 2018-05-21 PROCEDURE — 36415 COLL VENOUS BLD VENIPUNCTURE: CPT | Performed by: EMERGENCY MEDICINE

## 2018-05-21 PROCEDURE — 74011250636 HC RX REV CODE- 250/636: Performed by: PHYSICIAN ASSISTANT

## 2018-05-21 PROCEDURE — 96361 HYDRATE IV INFUSION ADD-ON: CPT

## 2018-05-21 PROCEDURE — 96375 TX/PRO/DX INJ NEW DRUG ADDON: CPT

## 2018-05-21 PROCEDURE — 96374 THER/PROPH/DIAG INJ IV PUSH: CPT

## 2018-05-21 PROCEDURE — 74176 CT ABD & PELVIS W/O CONTRAST: CPT

## 2018-05-21 PROCEDURE — 74011250637 HC RX REV CODE- 250/637: Performed by: PHYSICIAN ASSISTANT

## 2018-05-21 PROCEDURE — 85027 COMPLETE CBC AUTOMATED: CPT | Performed by: EMERGENCY MEDICINE

## 2018-05-21 PROCEDURE — 81025 URINE PREGNANCY TEST: CPT

## 2018-05-21 PROCEDURE — 81001 URINALYSIS AUTO W/SCOPE: CPT

## 2018-05-21 PROCEDURE — 99283 EMERGENCY DEPT VISIT LOW MDM: CPT

## 2018-05-21 PROCEDURE — 80053 COMPREHEN METABOLIC PANEL: CPT | Performed by: EMERGENCY MEDICINE

## 2018-05-21 RX ORDER — ONDANSETRON 4 MG/1
4 TABLET, ORALLY DISINTEGRATING ORAL
Qty: 10 TAB | Refills: 0 | Status: SHIPPED | OUTPATIENT
Start: 2018-05-21 | End: 2018-06-29 | Stop reason: ALTCHOICE

## 2018-05-21 RX ORDER — OXYCODONE AND ACETAMINOPHEN 5; 325 MG/1; MG/1
1 TABLET ORAL
Qty: 15 TAB | Refills: 0 | Status: SHIPPED | OUTPATIENT
Start: 2018-05-21 | End: 2018-05-24

## 2018-05-21 RX ORDER — MORPHINE SULFATE 4 MG/ML
6 INJECTION INTRAVENOUS ONCE
Status: COMPLETED | OUTPATIENT
Start: 2018-05-21 | End: 2018-05-21

## 2018-05-21 RX ORDER — TAMSULOSIN HYDROCHLORIDE 0.4 MG/1
0.4 CAPSULE ORAL
Status: COMPLETED | OUTPATIENT
Start: 2018-05-21 | End: 2018-05-21

## 2018-05-21 RX ORDER — KETOROLAC TROMETHAMINE 30 MG/ML
30 INJECTION, SOLUTION INTRAMUSCULAR; INTRAVENOUS
Status: COMPLETED | OUTPATIENT
Start: 2018-05-21 | End: 2018-05-21

## 2018-05-21 RX ORDER — TAMSULOSIN HYDROCHLORIDE 0.4 MG/1
0.4 CAPSULE ORAL DAILY
Qty: 7 CAP | Refills: 0 | Status: SHIPPED | OUTPATIENT
Start: 2018-05-21 | End: 2018-05-28

## 2018-05-21 RX ORDER — ONDANSETRON 2 MG/ML
4 INJECTION INTRAMUSCULAR; INTRAVENOUS ONCE
Status: COMPLETED | OUTPATIENT
Start: 2018-05-21 | End: 2018-05-21

## 2018-05-21 RX ORDER — KETOROLAC TROMETHAMINE 10 MG/1
10 TABLET, FILM COATED ORAL
Qty: 20 TAB | Refills: 0 | Status: SHIPPED | OUTPATIENT
Start: 2018-05-21 | End: 2018-08-08 | Stop reason: ALTCHOICE

## 2018-05-21 RX ADMIN — TAMSULOSIN HYDROCHLORIDE 0.4 MG: 0.4 CAPSULE ORAL at 21:51

## 2018-05-21 RX ADMIN — KETOROLAC TROMETHAMINE 30 MG: 30 INJECTION, SOLUTION INTRAMUSCULAR; INTRAVENOUS at 21:52

## 2018-05-21 RX ADMIN — MORPHINE SULFATE 6 MG: 4 INJECTION INTRAVENOUS at 21:51

## 2018-05-21 RX ADMIN — SODIUM CHLORIDE 1000 ML: 900 INJECTION, SOLUTION INTRAVENOUS at 21:51

## 2018-05-21 RX ADMIN — ONDANSETRON 4 MG: 2 INJECTION INTRAMUSCULAR; INTRAVENOUS at 21:52

## 2018-05-21 NOTE — LETTER
[address] 
[facility] EMERGENCY DEPT 
[address] [place] [address] [id number] [phone] Work/School Note Date: 5/21/2018 To Whom It May concern: 
 
Rolly Pitts was seen and treated today in the emergency room by the following provider(s): 
Attending Provider: Sandra Fernandez MD 
Physician Assistant: Quynh Zhang PA-C. Rolly Pitts may return to work on 23 May 2018. Sincerely, Quynh Zhang PA-C

## 2018-05-22 NOTE — ED NOTES
Discharge instructions reviewed with patient. Discharge instructions given to patient per Suri Heard PAC. Patient able to return/verbalize discharge instructions. Copy of discharge instructions given. Patient condition stable, respiratory status within normal limits, neuro status intact. Ambulatory out of ER, accompanied by boyfriend.

## 2018-05-22 NOTE — ED TRIAGE NOTES
Started as lower back pain both sides yesterday with vomiting. Tonight left lower back pain that wraps around to the front.

## 2018-05-22 NOTE — ED PROVIDER NOTES
EMERGENCY DEPARTMENT HISTORY AND PHYSICAL EXAM      Date: 5/21/2018  Patient Name: Lisa Franklin    History of Presenting Illness     Chief Complaint   Patient presents with    Abdominal Pain     left back pain and flank pain     History Provided By: Patient    HPI: Lisa Franklin, 33 y.o. female with PMHx significant for kidney stones, UTI, presents ambulatory with her partner to the ED with cc of new onset, worsening, moderate left flank pain with associated dysuria and hematuria that began yesterday morning. Pt states that the pain woke her up. She initially thought her sxs were due to period cramps as she is coming onto her cycle. Her pain improved yesterday and she was fine until 3 hours PTA. The pain was initially to the bilateral flanks, but is now concentrated to the left flank with LLQ radiation. She experienced N/V yesterday which is now mostly resolved. Pt c/o mild SOB. Her sxs are similar to past kidney stone pain 10 years ago which she passed. She is not currently followed by urology. Pt notes that she is trying to get pregnant, but a home UPT 2 days ago was negative. She has not medicated her sxs today. She denies recent falls or heavy lifting. Pt specifically denies fever, chest pain, vaginal discharge, or vaginal bleeding. There are no other complaints, changes, or physical findings at this time. Social Hx: former Tobacco, - EtOH, - Illicit Drugs    PCP: Cassy Garcia MD    Current Outpatient Prescriptions   Medication Sig Dispense Refill    ketorolac (TORADOL) 10 mg tablet Take 1 Tab by mouth every six (6) hours as needed for Pain. 20 Tab 0    ondansetron (ZOFRAN ODT) 4 mg disintegrating tablet Take 1 Tab by mouth every eight (8) hours as needed for Nausea. 10 Tab 0    oxyCODONE-acetaminophen (PERCOCET) 5-325 mg per tablet Take 1 Tab by mouth every six (6) hours as needed for Pain for up to 3 days. Max Daily Amount: 4 Tabs.  15 Tab 0    tamsulosin (FLOMAX) 0.4 mg capsule Take 1 Cap by mouth daily for 7 doses. 7 Cap 0    lamoTRIgine (LAMICTAL) 150 mg tablet Take 2 Tabs by mouth daily. Indications: BIPOLAR DISORDER IN REMISSION 180 Tab 2    cloNIDine HCl (CATAPRES) 0.1 mg tablet Take 0.1 mg by mouth as needed. Indications: anxiety       Past History     Past Medical History:  Past Medical History:   Diagnosis Date    Chronic kidney disease     UTI, kidney stones     Past Surgical History:  History reviewed. No pertinent surgical history. Family History:  History reviewed. No pertinent family history. Social History:  Social History   Substance Use Topics    Smoking status: Current Every Day Smoker     Last attempt to quit: 12/1/2012    Smokeless tobacco: Never Used      Comment: e cig - 1 cartridge a day    Alcohol use No     Allergies:  No Known Allergies    Review of Systems   Review of Systems   Constitutional: Negative for chills and fever. HENT: Negative for sore throat. Eyes: Negative for pain. Respiratory: Positive for shortness of breath (mild). Negative for cough. Cardiovascular: Negative for chest pain. Gastrointestinal: Positive for nausea (resolved) and vomiting (resolved). Negative for abdominal pain and diarrhea. Genitourinary: Positive for dysuria, flank pain (left sided) and hematuria. Negative for vaginal bleeding and vaginal discharge. Musculoskeletal: Negative for arthralgias and myalgias. Skin: Negative for rash. Neurological: Negative for dizziness, light-headedness, numbness and headaches. Psychiatric/Behavioral: Negative for behavioral problems and confusion. Physical Exam   Physical Exam   Constitutional: She is oriented to person, place, and time. She appears well-developed and well-nourished. Non-toxic appearance. No distress. Well appearing   HENT:   Head: Normocephalic and atraumatic.    Right Ear: External ear normal.   Left Ear: External ear normal.   Nose: Nose normal.   Eyes: Conjunctivae and EOM are normal.   Neck: Normal range of motion. Neck supple. Cardiovascular: Regular rhythm and normal heart sounds. Tachycardia present. No murmur heard. Pulmonary/Chest: Effort normal and breath sounds normal. She has no decreased breath sounds. She has no wheezes. Abdominal: Soft. Bowel sounds are normal. She exhibits no distension. There is no tenderness. There is CVA tenderness (left sided, with palpation). There is no guarding. Musculoskeletal: Normal range of motion. She exhibits no edema or tenderness. Neurological: She is alert and oriented to person, place, and time. Skin: Skin is warm and dry. No rash noted. She is not diaphoretic. Psychiatric: She has a normal mood and affect. Her behavior is normal. Judgment normal.   Nursing note and vitals reviewed.     Diagnostic Study Results     Labs -     Recent Results (from the past 12 hour(s))   URINALYSIS W/ REFLEX CULTURE    Collection Time: 05/21/18  9:23 PM   Result Value Ref Range    Color YELLOW/STRAW      Appearance CLOUDY (A) CLEAR      Specific gravity 1.022 1.003 - 1.030      pH (UA) 5.5 5.0 - 8.0      Protein NEGATIVE  NEG mg/dL    Glucose NEGATIVE  NEG mg/dL    Ketone NEGATIVE  NEG mg/dL    Bilirubin NEGATIVE  NEG      Blood LARGE (A) NEG      Urobilinogen 0.2 0.2 - 1.0 EU/dL    Nitrites NEGATIVE  NEG      Leukocyte Esterase NEGATIVE  NEG      WBC 5-10 0 - 4 /hpf    RBC 20-50 0 - 5 /hpf    Epithelial cells FEW FEW /lpf    Bacteria 1+ (A) NEG /hpf    UA:UC IF INDICATED URINE CULTURE ORDERED (A) CNI      Mucus 1+ (A) NEG /lpf   HCG URINE, QL    Collection Time: 05/21/18  9:23 PM   Result Value Ref Range    HCG urine, QL NEGATIVE  NEG     CBC W/O DIFF    Collection Time: 05/21/18  9:35 PM   Result Value Ref Range    WBC 9.4 3.6 - 11.0 K/uL    RBC 4.34 3.80 - 5.20 M/uL    HGB 13.2 11.5 - 16.0 g/dL    HCT 39.8 35.0 - 47.0 %    MCV 91.7 80.0 - 99.0 FL    MCH 30.4 26.0 - 34.0 PG    MCHC 33.2 30.0 - 36.5 g/dL    RDW 12.6 11.5 - 14.5 %    PLATELET 902 345 - 375 K/uL MPV 9.6 8.9 - 12.9 FL    NRBC 0.0 0  WBC    ABSOLUTE NRBC 0.00 0.00 - 0.18 K/uL   METABOLIC PANEL, COMPREHENSIVE    Collection Time: 05/21/18  9:35 PM   Result Value Ref Range    Sodium 139 136 - 145 mmol/L    Potassium 3.5 3.5 - 5.1 mmol/L    Chloride 105 97 - 108 mmol/L    CO2 27 21 - 32 mmol/L    Anion gap 7 5 - 15 mmol/L    Glucose 101 (H) 65 - 100 mg/dL    BUN 18 6 - 20 MG/DL    Creatinine 0.88 0.55 - 1.02 MG/DL    BUN/Creatinine ratio 20 12 - 20      GFR est AA >60 >60 ml/min/1.73m2    GFR est non-AA >60 >60 ml/min/1.73m2    Calcium 9.2 8.5 - 10.1 MG/DL    Bilirubin, total 0.3 0.2 - 1.0 MG/DL    ALT (SGPT) 30 12 - 78 U/L    AST (SGOT) 16 15 - 37 U/L    Alk. phosphatase 66 45 - 117 U/L    Protein, total 7.3 6.4 - 8.2 g/dL    Albumin 4.0 3.5 - 5.0 g/dL    Globulin 3.3 2.0 - 4.0 g/dL    A-G Ratio 1.2 1.1 - 2.2       Radiologic Studies -     CT Results  (Last 48 hours)               05/21/18 2206  CT ABD PELV WO CONT Final result    Impression:  IMPRESSION:    1. 3 mm obstructing stone in the lower left ureter. 2. Bilateral nonobstructive nephrolithiasis. Narrative:  INDICATION:  Left  Flank pain, stone disease suspected        EXAM: CT Abdomen and CT Pelvis without contrast.   CT dose reduction was achieved through use of a standardized protocol tailored   for this examination and automatic exposure control for dose modulation. FINDINGS:    There is mild left hydroureteronephrosis due to a 3 mm stone in the lower ureter   close to the bladder. There is bilateral nonobstructive nephrolithiasis. There   is no perirenal fluid or ascites. Liver shows no apparent significant finding without contrast. Pancreas, adrenal   glands, spleen and aorta show no significant enlargement. No inflammation is   seen. There is no pneumoperitoneum or significant adenopathy. The bladder is empty. There is no apparent pelvic mass.  The appendix is normal.               Medical Decision Making   I am the first provider for this patient. I reviewed the vital signs, available nursing notes, past medical history, past surgical history, family history and social history. Vital Signs-Reviewed the patient's vital signs. Patient Vitals for the past 12 hrs:   Temp Pulse Resp BP SpO2   05/21/18 2319 - 65 - 107/72 97 %   05/21/18 2245 - - - (!) 82/62 97 %   05/21/18 2239 - - - - 99 %   05/21/18 2230 - - - 113/76 -   05/21/18 2115 98.3 °F (36.8 °C) (!) 130 16 142/78 100 %     Pulse Oximetry Analysis - 100% on RA    Records Reviewed: Nursing Notes and Old Medical Records    Provider Notes (Medical Decision Making):   DDx: pyelonephritis, nephrolithiasis, cystitis, urinary tract infection, urinary retention, constipation, obstruction, ureter spasm, muscle strain, gastroenteritis. Patient presents with left sided flank and groin pain that began yesterday. Will assess with basic labs, urine, imaging and pain/nausea control. ED Course:   Initial assessment performed. The patients presenting problems have been discussed, and they are in agreement with the care plan formulated and outlined with them. I have encouraged them to ask questions as they arise throughout their visit. 10:28 PM  I have just reevaluated the patient. She reports resolution of her pain. I have reviewed her vital signs and determined there is currently no worsening in their condition or physical exam. Results have been reviewed with them and their questions have been answered. Will d/c after IVF completion. Disposition:  Discharge Note:  11:04 PM  The patient has been re-evaluated and is ready for discharge. Reviewed available results with patient. Counseled patient/parent/guardian on diagnosis and care plan. Patient has expressed understanding, and all questions have been answered. Patient agrees with plan and agrees to follow up as recommended, or return to the ED if their symptoms worsen.  Discharge instructions have been provided and explained to the patient, along with reasons to return to the ED. PLAN:  1. Discharge home  2. Medications as directed  3. Schedule f/u with Urology if problem persists  4. Return precautions reviewed    Discharge Medication List as of 5/21/2018 11:01 PM      START taking these medications    Details   ketorolac (TORADOL) 10 mg tablet Take 1 Tab by mouth every six (6) hours as needed for Pain., Normal, Disp-20 Tab, R-0      ondansetron (ZOFRAN ODT) 4 mg disintegrating tablet Take 1 Tab by mouth every eight (8) hours as needed for Nausea., Normal, Disp-10 Tab, R-0      oxyCODONE-acetaminophen (PERCOCET) 5-325 mg per tablet Take 1 Tab by mouth every six (6) hours as needed for Pain for up to 3 days. Max Daily Amount: 4 Tabs., Print, Disp-15 Tab, R-0      tamsulosin (FLOMAX) 0.4 mg capsule Take 1 Cap by mouth daily for 7 doses. , Normal, Disp-7 Cap, R-0         CONTINUE these medications which have NOT CHANGED    Details   lamoTRIgine (LAMICTAL) 150 mg tablet Take 2 Tabs by mouth daily. Indications: BIPOLAR DISORDER IN REMISSION, Normal, Disp-180 Tab, R-2      cloNIDine HCl (CATAPRES) 0.1 mg tablet Take 0.1 mg by mouth as needed. Indications: anxiety, Historical Med           2. Follow-up Information     Follow up With Details Comments [address], [place] Go to As needed, If symptoms worsen [address] [facility]  [address]  [phone]      [facility] EMERGENCY DEPT  As needed, If symptoms worsen [address] [place] [address]  [phone]        Return to ED if worse     Diagnosis     Clinical Impression:   1. Kidney stone    2. Flank pain    3. Hematuria, unspecified type    4. Non-intractable vomiting with nausea, unspecified vomiting type      Attestations:    Attestation:   This note is prepared by Andrey Pelayo, acting as scribe for LEILANI Pulliam PA-C: The scribe's documentation has been prepared under my direction and personally reviewed by me in its entirety. I confirm that the note above accurately reflects all work, treatment, procedures, and medical decision making performed by me.         This note will not be viewable in [employer]

## 2018-05-22 NOTE — DISCHARGE INSTRUCTIONS
Thank you!     Thank you for allowing us to provide you with excellent care today. We hope we addressed all of your concerns and needs. We strive to provide excellent quality care in the Emergency Department. You will receive a survey after your visit to evaluate the care you were provided.      Please rate us a level 5 (excellent), as anything less than excellent does not meet our goals.      If you feel that you have not received excellent quality care or timely care, please ask to speak to the nurse manager. Please choose us in the future for your continued health care needs. ______________________________________________________________________    The exam and treatment you received in the Emergency Department were for an urgent problem and are not intended as complete care. It is important that you follow-up with a doctor, nurse practitioner, or physician assistant to:  (1) confirm your diagnosis,  (2) re-evaluation of changes in your illness and treatment, and  (3) for ongoing care. If your symptoms become worse or you do not improve as expected and you are unable to reach your usual health care provider, you should return to the Emergency Department. We are available 24 hours a day. Take this sheet with you when you go to your follow-up visit. If you have any problem arranging the follow-up visit, contact [address] [place] [address] 21 [phone])    Make an appointment with your Primary Care doctor for follow up of this visit. Return to the ER if you are unable to be seen in the time recommended on your discharge instructions. Kidney Stone: Care Instructions  Your Care Instructions    Kidney stones are formed when salts, minerals, and other substances normally found in the urine clump together. They can be as small as grains of sand or, rarely, as large as golf balls. While the stone is traveling through the ureter, which is the tube that carries urine from the kidney to the bladder, you will probably feel pain.  The pain may be mild or very severe. You may also have some blood in your urine. As soon as the stone reaches the bladder, any intense pain should go away. If a stone is too large to pass on its own, you may need a medical procedure to help you pass the stone. The doctor has checked you carefully, but problems can develop later. If you notice any problems or new symptoms, get medical treatment right away. Follow-up care is a key part of your treatment and safety. Be sure to make and go to all appointments, and call your doctor if you are having problems. It's also a good idea to know your test results and keep a list of the medicines you take. How can you care for yourself at home? · Drink plenty of fluids, enough so that your urine is light yellow or clear like water. If you have kidney, heart, or liver disease and have to limit fluids, talk with your doctor before you increase the amount of fluids you drink. · Take pain medicines exactly as directed. Call your doctor if you think you are having a problem with your medicine. ¨ If the doctor gave you a prescription medicine for pain, take it as prescribed. ¨ If you are not taking a prescription pain medicine, ask your doctor if you can take an over-the-counter medicine. Read and follow all instructions on the label. · Your doctor may ask you to strain your urine so that you can collect your kidney stone when it passes. You can use a kitchen strainer or a tea strainer to catch the stone. Store it in a plastic bag until you see your doctor again. Preventing future kidney stones  Some changes in your diet may help prevent kidney stones. Depending on the cause of your stones, your doctor may recommend that you:  · Drink plenty of fluids, enough so that your urine is light yellow or clear like water. If you have kidney, heart, or liver disease and have to limit fluids, talk with your doctor before you increase the amount of fluids you drink.   · Limit coffee, tea, and alcohol. Also avoid grapefruit juice. · Do not take more than the recommended daily dose of vitamins C and D.  · Avoid antacids such as Gaviscon, Maalox, Mylanta, or Tums. · Limit the amount of salt (sodium) in your diet. · Eat a balanced diet that is not too high in protein. · Limit foods that are high in a substance called oxalate, which can cause kidney stones. These foods include dark green vegetables, rhubarb, chocolate, wheat bran, nuts, cranberries, and beans. When should you call for help? Call your doctor now or seek immediate medical care if:  ? · You cannot keep down fluids. ? · Your pain gets worse. ? · You have a fever or chills. ? · You have new or worse pain in your back just below your rib cage (the flank area). ? · You have new or more blood in your urine. ? Watch closely for changes in your health, and be sure to contact your doctor if:  ? · You do not get better as expected. Where can you learn more? Go to http://reza-kate.info/. Enter L604 in the search box to learn more about \"Kidney Stone: Care Instructions. \"  Current as of: May 12, 2017  Content Version: 11.4  © 3770-0886 [employer]. Care instructions adapted under license by [employer] (which disclaims liability or warranty for this information). If you have questions about a medical condition or this instruction, always ask your healthcare professional. [place] [address] any warranty or liability for your use of this information.

## 2018-05-23 LAB
BACTERIA SPEC CULT: NORMAL
CC UR VC: NORMAL
SERVICE CMNT-IMP: NORMAL

## 2018-05-24 ENCOUNTER — HOSPITAL ENCOUNTER (EMERGENCY)
Age: 28
Discharge: HOME | End: 2018-05-24
Attending: EMERGENCY MEDICINE
Payer: SUBSIDIZED

## 2018-05-24 VITALS
HEART RATE: 96 BPM | HEIGHT: 62 IN | TEMPERATURE: 97.5 F | BODY MASS INDEX: 36.11 KG/M2 | SYSTOLIC BLOOD PRESSURE: 122 MMHG | WEIGHT: 196.21 LBS | RESPIRATION RATE: 16 BRPM | OXYGEN SATURATION: 100 % | DIASTOLIC BLOOD PRESSURE: 78 MMHG

## 2018-05-24 DIAGNOSIS — N20.0 KIDNEY STONE: Primary | ICD-10-CM

## 2018-05-24 DIAGNOSIS — Z71.6 TOBACCO ABUSE COUNSELING: ICD-10-CM

## 2018-05-24 DIAGNOSIS — R11.2 NON-INTRACTABLE VOMITING WITH NAUSEA, UNSPECIFIED VOMITING TYPE: ICD-10-CM

## 2018-05-24 DIAGNOSIS — Z86.59 HISTORY OF BIPOLAR DISORDER: ICD-10-CM

## 2018-05-24 LAB
ALBUMIN SERPL-MCNC: 3.9 G/DL (ref 3.5–5)
ALBUMIN/GLOB SERPL: 1.2 {RATIO} (ref 1.1–2.2)
ALP SERPL-CCNC: 67 U/L (ref 45–117)
ALT SERPL-CCNC: 31 U/L (ref 12–78)
ANION GAP SERPL CALC-SCNC: 7 MMOL/L (ref 5–15)
APPEARANCE UR: CLEAR
AST SERPL-CCNC: 20 U/L (ref 15–37)
BACTERIA URNS QL MICRO: NEGATIVE /HPF
BASOPHILS # BLD: 0 K/UL (ref 0–0.1)
BASOPHILS NFR BLD: 0 % (ref 0–1)
BILIRUB SERPL-MCNC: 0.4 MG/DL (ref 0.2–1)
BILIRUB UR QL: NEGATIVE
BUN SERPL-MCNC: 16 MG/DL (ref 6–20)
BUN/CREAT SERPL: 18 (ref 12–20)
CALCIUM SERPL-MCNC: 9.1 MG/DL (ref 8.5–10.1)
CHLORIDE SERPL-SCNC: 107 MMOL/L (ref 97–108)
CO2 SERPL-SCNC: 28 MMOL/L (ref 21–32)
COLOR UR: ABNORMAL
CREAT SERPL-MCNC: 0.91 MG/DL (ref 0.55–1.02)
DIFFERENTIAL METHOD BLD: ABNORMAL
EOSINOPHIL # BLD: 0 K/UL (ref 0–0.4)
EOSINOPHIL NFR BLD: 1 % (ref 0–7)
EPITH CASTS URNS QL MICRO: ABNORMAL /LPF
ERYTHROCYTE [DISTWIDTH] IN BLOOD BY AUTOMATED COUNT: 12.5 % (ref 11.5–14.5)
GLOBULIN SER CALC-MCNC: 3.2 G/DL (ref 2–4)
GLUCOSE SERPL-MCNC: 95 MG/DL (ref 65–100)
GLUCOSE UR STRIP.AUTO-MCNC: NEGATIVE MG/DL
HCT VFR BLD AUTO: 39 % (ref 35–47)
HGB BLD-MCNC: 13 G/DL (ref 11.5–16)
HGB UR QL STRIP: ABNORMAL
HYALINE CASTS URNS QL MICRO: ABNORMAL /LPF (ref 0–5)
IMM GRANULOCYTES # BLD: 0 K/UL (ref 0–0.04)
IMM GRANULOCYTES NFR BLD AUTO: 0 % (ref 0–0.5)
KETONES UR QL STRIP.AUTO: NEGATIVE MG/DL
LEUKOCYTE ESTERASE UR QL STRIP.AUTO: NEGATIVE
LYMPHOCYTES # BLD: 1.2 K/UL (ref 0.8–3.5)
LYMPHOCYTES NFR BLD: 17 % (ref 12–49)
MCH RBC QN AUTO: 30.6 PG (ref 26–34)
MCHC RBC AUTO-ENTMCNC: 33.3 G/DL (ref 30–36.5)
MCV RBC AUTO: 91.8 FL (ref 80–99)
MONOCYTES # BLD: 0.5 K/UL (ref 0–1)
MONOCYTES NFR BLD: 7 % (ref 5–13)
NEUTS SEG # BLD: 5.6 K/UL (ref 1.8–8)
NEUTS SEG NFR BLD: 76 % (ref 32–75)
NITRITE UR QL STRIP.AUTO: NEGATIVE
NRBC # BLD: 0 K/UL (ref 0–0.01)
NRBC BLD-RTO: 0 PER 100 WBC
PH UR STRIP: 8.5 [PH] (ref 5–8)
PLATELET # BLD AUTO: 243 K/UL (ref 150–400)
PMV BLD AUTO: 9.6 FL (ref 8.9–12.9)
POTASSIUM SERPL-SCNC: 3.8 MMOL/L (ref 3.5–5.1)
PROT SERPL-MCNC: 7.1 G/DL (ref 6.4–8.2)
PROT UR STRIP-MCNC: NEGATIVE MG/DL
RBC # BLD AUTO: 4.25 M/UL (ref 3.8–5.2)
RBC #/AREA URNS HPF: ABNORMAL /HPF (ref 0–5)
SODIUM SERPL-SCNC: 142 MMOL/L (ref 136–145)
SP GR UR REFRACTOMETRY: 1.01 (ref 1–1.03)
UROBILINOGEN UR QL STRIP.AUTO: 0.2 EU/DL (ref 0.2–1)
WBC # BLD AUTO: 7.4 K/UL (ref 3.6–11)
WBC URNS QL MICRO: ABNORMAL /HPF (ref 0–4)

## 2018-05-24 PROCEDURE — 99283 EMERGENCY DEPT VISIT LOW MDM: CPT

## 2018-05-24 PROCEDURE — 80053 COMPREHEN METABOLIC PANEL: CPT | Performed by: PHYSICIAN ASSISTANT

## 2018-05-24 PROCEDURE — 74011250636 HC RX REV CODE- 250/636

## 2018-05-24 PROCEDURE — 36415 COLL VENOUS BLD VENIPUNCTURE: CPT | Performed by: PHYSICIAN ASSISTANT

## 2018-05-24 PROCEDURE — 74011250636 HC RX REV CODE- 250/636: Performed by: PHYSICIAN ASSISTANT

## 2018-05-24 PROCEDURE — 96374 THER/PROPH/DIAG INJ IV PUSH: CPT

## 2018-05-24 PROCEDURE — 85025 COMPLETE CBC W/AUTO DIFF WBC: CPT | Performed by: PHYSICIAN ASSISTANT

## 2018-05-24 PROCEDURE — 81001 URINALYSIS AUTO W/SCOPE: CPT | Performed by: EMERGENCY MEDICINE

## 2018-05-24 PROCEDURE — 96375 TX/PRO/DX INJ NEW DRUG ADDON: CPT

## 2018-05-24 RX ORDER — MORPHINE SULFATE 4 MG/ML
4 INJECTION INTRAVENOUS ONCE
Status: COMPLETED | OUTPATIENT
Start: 2018-05-24 | End: 2018-05-24

## 2018-05-24 RX ORDER — PROCHLORPERAZINE EDISYLATE 5 MG/ML
10 INJECTION INTRAMUSCULAR; INTRAVENOUS ONCE
Status: COMPLETED | OUTPATIENT
Start: 2018-05-24 | End: 2018-05-24

## 2018-05-24 RX ORDER — PROCHLORPERAZINE MALEATE 10 MG
10 TABLET ORAL
Qty: 12 TAB | Refills: 0 | Status: SHIPPED | OUTPATIENT
Start: 2018-05-24 | End: 2018-05-31

## 2018-05-24 RX ORDER — MORPHINE SULFATE 10 MG/ML
INJECTION, SOLUTION INTRAMUSCULAR; INTRAVENOUS
Status: DISCONTINUED
Start: 2018-05-24 | End: 2018-05-24 | Stop reason: HOSPADM

## 2018-05-24 RX ADMIN — MORPHINE SULFATE 4 MG: 4 INJECTION INTRAVENOUS at 11:44

## 2018-05-24 RX ADMIN — PROCHLORPERAZINE EDISYLATE 10 MG: 5 INJECTION INTRAMUSCULAR; INTRAVENOUS at 11:42

## 2018-05-24 NOTE — ED PROVIDER NOTES
EMERGENCY DEPARTMENT HISTORY AND PHYSICAL EXAM      Date: 5/24/2018  Patient Name: Ladarius Lopez    History of Presenting Illness     Chief Complaint   Patient presents with    Flank Pain     left sided, diagnosed with kidney stone on 5/21/18 and states pain has worsened and she cannot keep pain meds down due to n/v       History Provided By: Patient    HPI: Ladarius Lopez, 33 y.o. female with PMHx significant for UTI, Kidney stones, presents ambulatory to the ED with cc of constant, worsened left sided flank pain this morning with associated nausea and vomiting. Pt states that she was diagnosed with a kidney stone on 5/21/18. She was discharged home on Zofran and Oxycodone, however, she states that she has received no relief of nausea with Zofran and has been unable to keep the Oxycodone down as a result. She denies any fever or hematuria. She states that she does not believe the stone has passed. PCP: Cindi Alvares MD    There are no other complaints, changes, or physical findings at this time. Current Facility-Administered Medications   Medication Dose Route Frequency Provider Last Rate Last Dose    morphine 10 mg/ml injection              Current Outpatient Prescriptions   Medication Sig Dispense Refill    prochlorperazine (COMPAZINE) 10 mg tablet Take 1 Tab by mouth every eight (8) hours as needed for Nausea for up to 7 days. 12 Tab 0    ketorolac (TORADOL) 10 mg tablet Take 1 Tab by mouth every six (6) hours as needed for Pain. 20 Tab 0    ondansetron (ZOFRAN ODT) 4 mg disintegrating tablet Take 1 Tab by mouth every eight (8) hours as needed for Nausea. 10 Tab 0    oxyCODONE-acetaminophen (PERCOCET) 5-325 mg per tablet Take 1 Tab by mouth every six (6) hours as needed for Pain for up to 3 days. Max Daily Amount: 4 Tabs. 15 Tab 0    tamsulosin (FLOMAX) 0.4 mg capsule Take 1 Cap by mouth daily for 7 doses. 7 Cap 0    lamoTRIgine (LAMICTAL) 150 mg tablet Take 2 Tabs by mouth daily. Indications: BIPOLAR DISORDER IN REMISSION 180 Tab 2    cloNIDine HCl (CATAPRES) 0.1 mg tablet Take 0.1 mg by mouth as needed. Indications: anxiety         Past History     Past Medical History:  Past Medical History:   Diagnosis Date    Chronic kidney disease     UTI, kidney stones       Past Surgical History:  No past surgical history on file. Family History:  No family history on file. Social History:  Social History   Substance Use Topics    Smoking status: Current Every Day Smoker     Last attempt to quit: 12/1/2012    Smokeless tobacco: Never Used      Comment: e cig - 1 cartridge a day    Alcohol use No       Allergies:  No Known Allergies      Review of Systems   Review of Systems   Constitutional: Negative. Negative for activity change, appetite change, chills, diaphoresis, fever and unexpected weight change. HENT: Negative for congestion, hearing loss, rhinorrhea, sinus pressure, sneezing, sore throat and trouble swallowing. Eyes: Negative for pain, redness, itching and visual disturbance. Respiratory: Negative for cough, shortness of breath and wheezing. Cardiovascular: Negative for chest pain, palpitations and leg swelling. Gastrointestinal: Positive for nausea and vomiting. Negative for abdominal pain, constipation and diarrhea. Genitourinary: Positive for flank pain. Negative for dysuria and hematuria. Musculoskeletal: Negative for arthralgias, gait problem and myalgias. Skin: Negative for color change, pallor, rash and wound. Neurological: Negative for tremors, weakness, light-headedness, numbness and headaches. All other systems reviewed and are negative. Physical Exam   Physical Exam   Constitutional: She is oriented to person, place, and time. She appears well-developed and well-nourished. No distress. 33 y.o.  female in NAD  Communicates appropriately and in full sentences  No active vomiting on exam.    HENT:   Head: Normocephalic and atraumatic. Eyes: Conjunctivae are normal. Pupils are equal, round, and reactive to light. Right eye exhibits no discharge. Left eye exhibits no discharge. Neck: Normal range of motion. Neck supple. No nuchal rigidity or meningeal signs   Pulmonary/Chest: Effort normal. No respiratory distress. Abdominal:   Minimal left CVA tenderness. Clutching left flank on exam. Generalized abdominal tenderness with palpation. Musculoskeletal: Normal range of motion. She exhibits no edema, tenderness or deformity. No neurologic, motor, vascular, or compartment embarrassment observed on exam. No focal neurologic deficits. Neurological: She is alert and oriented to person, place, and time. Skin: Skin is warm and dry. No rash noted. She is not diaphoretic. No erythema. No pallor. Psychiatric: She has a normal mood and affect. Her behavior is normal.   Nursing note and vitals reviewed.         Diagnostic Study Results     Labs -   Recent Results (from the past 12 hour(s))   URINALYSIS W/ RFLX MICROSCOPIC    Collection Time: 05/24/18 10:46 AM   Result Value Ref Range    Color YELLOW/STRAW      Appearance CLEAR CLEAR      Specific gravity 1.007 1.003 - 1.030      pH (UA) 8.5 (H) 5.0 - 8.0      Protein NEGATIVE  NEG mg/dL    Glucose NEGATIVE  NEG mg/dL    Ketone NEGATIVE  NEG mg/dL    Bilirubin NEGATIVE  NEG      Blood TRACE (A) NEG      Urobilinogen 0.2 0.2 - 1.0 EU/dL    Nitrites NEGATIVE  NEG      Leukocyte Esterase NEGATIVE  NEG      WBC 0-4 0 - 4 /hpf    RBC 0-5 0 - 5 /hpf    Epithelial cells FEW FEW /lpf    Bacteria NEGATIVE  NEG /hpf    Hyaline cast 0-2 0 - 5 /lpf   CBC WITH AUTOMATED DIFF    Collection Time: 05/24/18 11:35 AM   Result Value Ref Range    WBC 7.4 3.6 - 11.0 K/uL    RBC 4.25 3.80 - 5.20 M/uL    HGB 13.0 11.5 - 16.0 g/dL    HCT 39.0 35.0 - 47.0 %    MCV 91.8 80.0 - 99.0 FL    MCH 30.6 26.0 - 34.0 PG    MCHC 33.3 30.0 - 36.5 g/dL    RDW 12.5 11.5 - 14.5 %    PLATELET 366 308 - 548 K/uL    MPV 9.6 8.9 - 12.9 FL NRBC 0.0 0  WBC    ABSOLUTE NRBC 0.00 0.00 - 0.01 K/uL    NEUTROPHILS 76 (H) 32 - 75 %    LYMPHOCYTES 17 12 - 49 %    MONOCYTES 7 5 - 13 %    EOSINOPHILS 1 0 - 7 %    BASOPHILS 0 0 - 1 %    IMMATURE GRANULOCYTES 0 0.0 - 0.5 %    ABS. NEUTROPHILS 5.6 1.8 - 8.0 K/UL    ABS. LYMPHOCYTES 1.2 0.8 - 3.5 K/UL    ABS. MONOCYTES 0.5 0.0 - 1.0 K/UL    ABS. EOSINOPHILS 0.0 0.0 - 0.4 K/UL    ABS. BASOPHILS 0.0 0.0 - 0.1 K/UL    ABS. IMM. GRANS. 0.0 0.00 - 0.04 K/UL    DF AUTOMATED     METABOLIC PANEL, COMPREHENSIVE    Collection Time: 05/24/18 11:35 AM   Result Value Ref Range    Sodium 142 136 - 145 mmol/L    Potassium 3.8 3.5 - 5.1 mmol/L    Chloride 107 97 - 108 mmol/L    CO2 28 21 - 32 mmol/L    Anion gap 7 5 - 15 mmol/L    Glucose 95 65 - 100 mg/dL    BUN 16 6 - 20 MG/DL    Creatinine 0.91 0.55 - 1.02 MG/DL    BUN/Creatinine ratio 18 12 - 20      GFR est AA >60 >60 ml/min/1.73m2    GFR est non-AA >60 >60 ml/min/1.73m2    Calcium 9.1 8.5 - 10.1 MG/DL    Bilirubin, total 0.4 0.2 - 1.0 MG/DL    ALT (SGPT) 31 12 - 78 U/L    AST (SGOT) 20 15 - 37 U/L    Alk. phosphatase 67 45 - 117 U/L    Protein, total 7.1 6.4 - 8.2 g/dL    Albumin 3.9 3.5 - 5.0 g/dL    Globulin 3.2 2.0 - 4.0 g/dL    A-G Ratio 1.2 1.1 - 2.2         Medical Decision Making   I am the first provider for this patient. I reviewed our electronic medical record system for any past medical records that were available that may contribute to the patients current condition, the nursing notes and vital signs from today's visit     Nursing notes will be reviewed as they become available in realtime while the pt is in the ED. Records Reviewed: Nursing Notes and Old Medical Records    Provider Notes/Medical Decision Making:  DDx: Kidney stone, UTI, pyelonephritis     Progress Note:  The patients presenting problems have been discussed, and they are in agreement with the care plan formulated and outlined with them.  I have encouraged them to ask questions as they arise throughout their visit. Will continue to monitor. 12:37 PM  I have reviewed discharge instructions with the patient and explained medications with which she is being discharged. The patient verbalized understanding and agrees with plan. She states her pain has improved and is now 0/10. She is no longer having nausea. She has tolerated two PO sodas. DISCHARGE NOTE:  12:37 PM  Klaudia Andersen's  results have been reviewed with her. She has been counseled regarding her diagnosis. She verbally conveys understanding and agreement of the signs, symptoms, diagnosis, treatment and prognosis and additionally agrees to follow up as recommended with Dr. Linh Davenport MD in 24 - 48 hours. She also agrees with the care-plan and conveys that all of her questions have been answered. I have also put together some discharge instructions for her that include: 1) educational information regarding their diagnosis, 2) how to care for their diagnosis at home, as well a 3) list of reasons why they would want to return to the ED prior to their follow-up appointment, should their condition change. She and/or family's questions have been answered. I have encouraged them to see the official results in [facility]\" or to retrieve the specifics of their results from medical records. Plan:  1. Return precautions  2. Medications as prescribed  3. Follow-ups as discussed  Discharge Medication List as of 5/24/2018 12:34 PM      START taking these medications    Details   prochlorperazine (COMPAZINE) 10 mg tablet Take 1 Tab by mouth every eight (8) hours as needed for Nausea for up to 7 days. , Normal, Disp-12 Tab, R-0         CONTINUE these medications which have NOT CHANGED    Details   ketorolac (TORADOL) 10 mg tablet Take 1 Tab by mouth every six (6) hours as needed for Pain., Normal, Disp-20 Tab, R-0      ondansetron (ZOFRAN ODT) 4 mg disintegrating tablet Take 1 Tab by mouth every eight (8) hours as needed for Nausea., Normal, Disp-10 Tab, R-0      oxyCODONE-acetaminophen (PERCOCET) 5-325 mg per tablet Take 1 Tab by mouth every six (6) hours as needed for Pain for up to 3 days. Max Daily Amount: 4 Tabs., Print, Disp-15 Tab, R-0      tamsulosin (FLOMAX) 0.4 mg capsule Take 1 Cap by mouth daily for 7 doses. , Normal, Disp-7 Cap, R-0      lamoTRIgine (LAMICTAL) 150 mg tablet Take 2 Tabs by mouth daily. Indications: BIPOLAR DISORDER IN REMISSION, Normal, Disp-180 Tab, R-2      cloNIDine HCl (CATAPRES) 0.1 mg tablet Take 0.1 mg by mouth as needed. Indications: anxiety, Historical Med           Follow-up Information     Follow up With Details Comments Contact Info    Zoey Martinez MD Schedule an appointment as soon as possible for a visit in 2 days As needed, If symptoms worsen, Possible further evaluation and treatment [address] [place] [address] [phone]      Kidney Stone Hotline Call today  [phone]        Return to the closest emergency room or follow up sooner for any deterioration. Diagnosis     Clinical Impression:   1. Kidney stone    2. Non-intractable vomiting with nausea, unspecified vomiting type    3. Tobacco abuse counseling    4. History of bipolar disorder        Attestations: This note is prepared by Vaughn Becker, acting as Scribe for [employer]. [employer]: The scribe's documentation has been prepared under my direction and personally reviewed by me in its entirety. I confirm that the note above accurately reflects all work, treatment, procedures, and medical decision making performed by me. This note will not be viewable in [address]

## 2018-05-24 NOTE — LETTER
[address] 
[facility] EMERGENCY DEPT 
[address] [place] [address] [id number] [phone] Work/School Note Date: 5/24/2018 To Whom It May concern: 
 
Modesta Baez was seen and treated today in the emergency room by the following provider(s): 
Attending Provider: Vitaliy Barcenas. Vaughn Del Cid MD 
Physician Assistant: Moi Tariq. Modesta Baez may return to work on 5/26/2018. Sincerely, 
 
 
 
 
Moi Tariq

## 2018-05-24 NOTE — DISCHARGE INSTRUCTIONS
Kidney Stone: Care Instructions  Your Care Instructions    Kidney stones are formed when salts, minerals, and other substances normally found in the urine clump together. They can be as small as grains of sand or, rarely, as large as golf balls. While the stone is traveling through the ureter, which is the tube that carries urine from the kidney to the bladder, you will probably feel pain. The pain may be mild or very severe. You may also have some blood in your urine. As soon as the stone reaches the bladder, any intense pain should go away. If a stone is too large to pass on its own, you may need a medical procedure to help you pass the stone. The doctor has checked you carefully, but problems can develop later. If you notice any problems or new symptoms, get medical treatment right away. Follow-up care is a key part of your treatment and safety. Be sure to make and go to all appointments, and call your doctor if you are having problems. It's also a good idea to know your test results and keep a list of the medicines you take. How can you care for yourself at home? · Drink plenty of fluids, enough so that your urine is light yellow or clear like water. If you have kidney, heart, or liver disease and have to limit fluids, talk with your doctor before you increase the amount of fluids you drink. · Take pain medicines exactly as directed. Call your doctor if you think you are having a problem with your medicine. ¨ If the doctor gave you a prescription medicine for pain, take it as prescribed. ¨ If you are not taking a prescription pain medicine, ask your doctor if you can take an over-the-counter medicine. Read and follow all instructions on the label. · Your doctor may ask you to strain your urine so that you can collect your kidney stone when it passes. You can use a kitchen strainer or a tea strainer to catch the stone. Store it in a plastic bag until you see your doctor again.   Preventing future kidney stones  Some changes in your diet may help prevent kidney stones. Depending on the cause of your stones, your doctor may recommend that you:  · Drink plenty of fluids, enough so that your urine is light yellow or clear like water. If you have kidney, heart, or liver disease and have to limit fluids, talk with your doctor before you increase the amount of fluids you drink. · Limit coffee, tea, and alcohol. Also avoid grapefruit juice. · Do not take more than the recommended daily dose of vitamins C and D.  · Avoid antacids such as Gaviscon, Maalox, Mylanta, or Tums. · Limit the amount of salt (sodium) in your diet. · Eat a balanced diet that is not too high in protein. · Limit foods that are high in a substance called oxalate, which can cause kidney stones. These foods include dark green vegetables, rhubarb, chocolate, wheat bran, nuts, cranberries, and beans. When should you call for help? Call your doctor now or seek immediate medical care if:  ? · You cannot keep down fluids. ? · Your pain gets worse. ? · You have a fever or chills. ? · You have new or worse pain in your back just below your rib cage (the flank area). ? · You have new or more blood in your urine. ? Watch closely for changes in your health, and be sure to contact your doctor if:  ? · You do not get better as expected. Where can you learn more? Go to http://reza-kate.info/. Enter S019 in the search box to learn more about \"Kidney Stone: Care Instructions. \"  Current as of: May 12, 2017  Content Version: 11.4  © 1234-5824 [employer]. Care instructions adapted under license by [employer] (which disclaims liability or warranty for this information). If you have questions about a medical condition or this instruction, always ask your healthcare professional. [address] any warranty or liability for your use of this information.          Nausea and Vomiting: Care Instructions  Your Care Instructions    When you are nauseated, you may feel weak and sweaty and notice a lot of saliva in your mouth. Nausea often leads to vomiting. Most of the time you do not need to worry about nausea and vomiting, but they can be signs of other illnesses. Two common causes of nausea and vomiting are stomach flu and food poisoning. Nausea and vomiting from viral stomach flu will usually start to improve within 24 hours. Nausea and vomiting from food poisoning may last from 12 to 48 hours. The doctor has checked you carefully, but problems can develop later. If you notice any problems or new symptoms, get medical treatment right away. Follow-up care is a key part of your treatment and safety. Be sure to make and go to all appointments, and call your doctor if you are having problems. It's also a good idea to know your test results and keep a list of the medicines you take. How can you care for yourself at home? · To prevent dehydration, drink plenty of fluids, enough so that your urine is light yellow or clear like water. Choose water and other caffeine-free clear liquids until you feel better. If you have kidney, heart, or liver disease and have to limit fluids, talk with your doctor before you increase the amount of fluids you drink. · Rest in bed until you feel better. · When you are able to eat, try clear soups, mild foods, and liquids until all symptoms are gone for 12 to 48 hours. Other good choices include dry toast, crackers, cooked cereal, and gelatin dessert, such as Jell-O. When should you call for help? Call 911 anytime you think you may need emergency care. For example, call if:  ? · You passed out (lost consciousness). ?Call your doctor now or seek immediate medical care if:  ? · You have symptoms of dehydration, such as:  ¨ Dry eyes and a dry mouth. ¨ Passing only a little dark urine. ¨ Feeling thirstier than usual.   ? · You have new or worsening belly pain. ? · You have a new or higher fever. ? · You vomit blood or what looks like coffee grounds. ? Watch closely for changes in your health, and be sure to contact your doctor if:  ? · You have ongoing nausea and vomiting. ? · Your vomiting is getting worse. ? · Your vomiting lasts longer than 2 days. ? · You are not getting better as expected. Where can you learn more? Go to http://reza-kate.info/. Enter 25 [id number] in the search box to learn more about \"Nausea and Vomiting: Care Instructions. \"  Current as of: March 20, 2017  Content Version: 11.4  © 9178-0909 [employer]. Care instructions adapted under license by [employer] (which disclaims liability or warranty for this information). If you have questions about a medical condition or this instruction, always ask your healthcare professional. [address] any warranty or liability for your use of this information.

## 2018-05-24 NOTE — ED NOTES
Pt arrives accompanied by mother for increased L flank pain x this morning with nausea/vomiting Pt states she was seen in ED on 5/21 and dx with kidney stone Pt states she was given Rx Zofran and Oxy but states she hasn't been able to keep medications down today.    Pt denies any other new complaints     Pt alert oriented x 4

## 2018-05-24 NOTE — ED NOTES
Pt discharged by [facility]. Pt provided with discharge instructions Rx and instructions on follow up care. Pt out of ED under own power steady gait accompanied by family member.

## 2018-05-29 ENCOUNTER — APPOINTMENT (OUTPATIENT)
Dept: GENERAL RADIOLOGY | Age: 28
End: 2018-05-29
Attending: EMERGENCY MEDICINE
Payer: SUBSIDIZED

## 2018-05-29 ENCOUNTER — APPOINTMENT (OUTPATIENT)
Dept: ULTRASOUND IMAGING | Age: 28
End: 2018-05-29
Attending: EMERGENCY MEDICINE
Payer: SUBSIDIZED

## 2018-05-29 ENCOUNTER — HOSPITAL ENCOUNTER (EMERGENCY)
Age: 28
Discharge: HOME | End: 2018-05-29
Attending: EMERGENCY MEDICINE
Payer: SUBSIDIZED

## 2018-05-29 VITALS
HEART RATE: 83 BPM | TEMPERATURE: 97.5 F | OXYGEN SATURATION: 95 % | BODY MASS INDEX: 36.07 KG/M2 | RESPIRATION RATE: 18 BRPM | HEIGHT: 62 IN | DIASTOLIC BLOOD PRESSURE: 74 MMHG | SYSTOLIC BLOOD PRESSURE: 109 MMHG | WEIGHT: 196 LBS

## 2018-05-29 DIAGNOSIS — N23 RENAL COLIC: Primary | ICD-10-CM

## 2018-05-29 LAB
ANION GAP SERPL CALC-SCNC: 6 MMOL/L (ref 5–15)
APPEARANCE UR: CLEAR
BACTERIA URNS QL MICRO: NEGATIVE /HPF
BILIRUB UR QL: NEGATIVE
BUN SERPL-MCNC: 23 MG/DL (ref 6–20)
BUN/CREAT SERPL: 21 (ref 12–20)
CALCIUM SERPL-MCNC: 8.5 MG/DL (ref 8.5–10.1)
CHLORIDE SERPL-SCNC: 104 MMOL/L (ref 97–108)
CO2 SERPL-SCNC: 25 MMOL/L (ref 21–32)
COLOR UR: ABNORMAL
CREAT SERPL-MCNC: 1.12 MG/DL (ref 0.55–1.02)
EPITH CASTS URNS QL MICRO: ABNORMAL /LPF
GLUCOSE SERPL-MCNC: 92 MG/DL (ref 65–100)
GLUCOSE UR STRIP.AUTO-MCNC: NEGATIVE MG/DL
HCG UR QL: NEGATIVE
HGB UR QL STRIP: ABNORMAL
HYALINE CASTS URNS QL MICRO: ABNORMAL /LPF (ref 0–5)
KETONES UR QL STRIP.AUTO: NEGATIVE MG/DL
LEUKOCYTE ESTERASE UR QL STRIP.AUTO: ABNORMAL
NITRITE UR QL STRIP.AUTO: NEGATIVE
PH UR STRIP: 7.5 [PH] (ref 5–8)
POTASSIUM SERPL-SCNC: 4.2 MMOL/L (ref 3.5–5.1)
PROT UR STRIP-MCNC: NEGATIVE MG/DL
RBC #/AREA URNS HPF: ABNORMAL /HPF (ref 0–5)
SODIUM SERPL-SCNC: 135 MMOL/L (ref 136–145)
SP GR UR REFRACTOMETRY: 1.01 (ref 1–1.03)
UR CULT HOLD, URHOLD: NORMAL
UROBILINOGEN UR QL STRIP.AUTO: 0.2 EU/DL (ref 0.2–1)
WBC URNS QL MICRO: ABNORMAL /HPF (ref 0–4)

## 2018-05-29 PROCEDURE — 74011250636 HC RX REV CODE- 250/636: Performed by: EMERGENCY MEDICINE

## 2018-05-29 PROCEDURE — 81025 URINE PREGNANCY TEST: CPT

## 2018-05-29 PROCEDURE — 74018 RADEX ABDOMEN 1 VIEW: CPT

## 2018-05-29 PROCEDURE — 96374 THER/PROPH/DIAG INJ IV PUSH: CPT

## 2018-05-29 PROCEDURE — 81001 URINALYSIS AUTO W/SCOPE: CPT | Performed by: EMERGENCY MEDICINE

## 2018-05-29 PROCEDURE — 96375 TX/PRO/DX INJ NEW DRUG ADDON: CPT

## 2018-05-29 PROCEDURE — 80048 BASIC METABOLIC PNL TOTAL CA: CPT | Performed by: EMERGENCY MEDICINE

## 2018-05-29 PROCEDURE — 96361 HYDRATE IV INFUSION ADD-ON: CPT

## 2018-05-29 PROCEDURE — 99283 EMERGENCY DEPT VISIT LOW MDM: CPT

## 2018-05-29 PROCEDURE — 76770 US EXAM ABDO BACK WALL COMP: CPT

## 2018-05-29 PROCEDURE — 36415 COLL VENOUS BLD VENIPUNCTURE: CPT | Performed by: EMERGENCY MEDICINE

## 2018-05-29 RX ORDER — SODIUM CHLORIDE 9 MG/ML
1000 INJECTION, SOLUTION INTRAVENOUS ONCE
Status: COMPLETED | OUTPATIENT
Start: 2018-05-29 | End: 2018-05-29

## 2018-05-29 RX ORDER — KETOROLAC TROMETHAMINE 30 MG/ML
30 INJECTION, SOLUTION INTRAMUSCULAR; INTRAVENOUS
Status: COMPLETED | OUTPATIENT
Start: 2018-05-29 | End: 2018-05-29

## 2018-05-29 RX ORDER — ONDANSETRON 2 MG/ML
4 INJECTION INTRAMUSCULAR; INTRAVENOUS
Status: COMPLETED | OUTPATIENT
Start: 2018-05-29 | End: 2018-05-29

## 2018-05-29 RX ADMIN — KETOROLAC TROMETHAMINE 15 MG: 30 INJECTION, SOLUTION INTRAMUSCULAR; INTRAVENOUS at 14:58

## 2018-05-29 RX ADMIN — SODIUM CHLORIDE 1000 ML: 900 INJECTION, SOLUTION INTRAVENOUS at 15:30

## 2018-05-29 RX ADMIN — ONDANSETRON 4 MG: 2 INJECTION INTRAMUSCULAR; INTRAVENOUS at 14:58

## 2018-05-29 RX ADMIN — SODIUM CHLORIDE 1000 ML: 900 INJECTION, SOLUTION INTRAVENOUS at 14:58

## 2018-05-29 NOTE — ED PROVIDER NOTES
HPI Comments: 33 y.o. female with past medical history significant for CKD who presents via private vehicle with chief complaint of flank pain. Pt c/o L side flank pain and LLQ pain since this AM. Pt also c/o nausea and vomiting, has vomiting 6 times since waking up at 0600 this AM, last episode was at 1200. Pt has not followed up with urology about her known kidney stones. Pt was given compazine for nausea and toradol and oxycodone for pain and has been unable to keep any of them down. Pt reports her pain is a 5/10 at this time and has improved since being in the ED. Denies fever or chills. There are no other acute medical concerns at this time. Social hx: +tobacco smoker, denies EtOH consumption     PCP: Linh Davenport MD    Old chart reviewed - Pt was seen in the ED at [facility] on 5/21/18 and 5/24/18 for kidney stones. CT scan on 5/21 showed a 3 mm stone in the distal L ureter. UA from both visits was negative for UTI. Note written by Tonia Naranjo, as dictated by Michele Carrera DO 2:33 PM      The history is provided by the patient. No  was used. Past Medical History:   Diagnosis Date    Chronic kidney disease     UTI, kidney stones       History reviewed. No pertinent surgical history. History reviewed. No pertinent family history. Social History     Social History    Marital status: UNKNOWN     Spouse name: N/A    Number of children: N/A    Years of education: N/A     Occupational History    Not on file. Social History Main Topics    Smoking status: Current Every Day Smoker     Last attempt to quit: 12/1/2012    Smokeless tobacco: Never Used      Comment: e cig - 1 cartridge a day    Alcohol use No    Drug use: No    Sexual activity: Not on file     Other Topics Concern    Not on file     Social History Narrative         ALLERGIES: Review of patient's allergies indicates no known allergies.     Review of Systems   Constitutional: Negative for chills and fever. HENT: Negative for congestion and sore throat. Respiratory: Negative for cough and shortness of breath. Cardiovascular: Negative for chest pain. Gastrointestinal: Positive for abdominal pain, nausea and vomiting. Negative for diarrhea. Genitourinary: Positive for flank pain. Negative for difficulty urinating, dysuria and hematuria. Musculoskeletal: Negative for back pain and neck pain. Skin: Negative for color change. Neurological: Negative for syncope and headaches. All other systems reviewed and are negative. Vitals:    05/29/18 1248   BP: 117/80   Pulse: 60   Resp: 18   Temp: 97.7 °F (36.5 °C)   SpO2: 98%   Weight: 88.9 kg (196 lb)   Height: 5' 2\" (1.575 m)            Physical Exam      Constitutional: Pt is awake and alert. Pt appears well-developed and well-nourished. NAD. HENT:   Head: Normocephalic and atraumatic. Nose: Nose normal.   Mouth/Throat: Oropharynx is clear and moist. No oropharyngeal exudate. Eyes: Conjunctivae and extraocular motions are normal. Pupils are equal, round, and reactive to light. Right eye exhibits no discharge. Left eye exhibits no discharge. No scleral icterus. Neck: No tracheal deviation present. Supple neck. Cardiovascular: Normal rate, regular rhythm, normal heart sounds and intact distal pulses. Exam reveals no gallop and no friction rub. No murmur heard. Pulmonary/Chest: Effort normal and breath sounds normal.  Pt  has no wheezes. Pt  has no rales. Abdominal: Soft. Pt  exhibits no distension and no mass. Pt  has no rebound and no guarding. Mild L flank tenderness. Musculoskeletal:  Pt  exhibits no edema and no tenderness. Ext: Normal ROM in all four extremities; not tender to palpation; distal pulses are normal, no edema. Neurological:  Pt is alert. nonfocal neuro exam.  Skin: Skin is warm and dry. Pt  is not diaphoretic. Psychiatric:  Pt  has a normal mood and affect.  Behavior is normal. Note written by Tonia Graves, as dictated by Aamir Hunt,  3:00 PM      [facility]      ED Course       Procedures    PROGRESS NOTE:  4:18 PM  Pt is feeling better at this time. Pt will f/u with [address] [place] [address] Urology, was informed that they will see her regardless of insurance. Labs reviewed    Pain improved greatly.

## 2018-05-29 NOTE — ED TRIAGE NOTES
Patient comes to the ER c/o L flank pain and vomiting. Patient was seen x2 at [facility] last week for kidney stones.

## 2018-05-29 NOTE — ED NOTES

## 2018-05-29 NOTE — DISCHARGE INSTRUCTIONS
Learning About Diet for Kidney Stone Prevention  What are kidney stones? Kidney stones are made of salts and minerals in the urine that form small \"jonathan. \" Stones can form in the kidneys and the ureters (the tubes that lead from the kidneys to the bladder). They can also form in the bladder. Stones may not cause a problem as long as they stay in the kidneys. But they can cause sudden, severe pain. Pain is most likely when the stones travel from the kidneys to the bladder. Kidney stones can cause bloody urine. Kidney stones often run in families. You are more likely to get them if you don't drink enough fluids, mainly water. Certain foods and drinks and some dietary supplements may also increase your risk for kidney stones if you consume too much of them. What can you do to prevent kidney stones? Changing what you eat may not prevent all types of kidney stones. But for people who have a history of certain kinds of kidney stones, some changes in diet may help. A dietitian can help you set up a meal plan that includes healthy, low-oxalate choices. Here are some general guidelines to get you started. Plan your meals and snacks around foods that are low in oxalate. These foods include:  · Corn, kale, parsnips, and squash,. · Beef, chicken, pork, turkey, and fish. · Milk, butter, cheese, and yogurt. You can eat certain foods that are medium-high in oxalate, but eat them only once in a while. These foods include:  · Bread. · Brown rice. · English muffins. · Figs. · Popcorn. · String beans. · Tomatoes. Limit very high-oxalate foods, including:  · Black tea. · Coffee. · Chocolate. · Dark green vegetables. · Nuts. Here are some other things you can do to help prevent kidney stones. · Drink plenty of fluids. If you have kidney, heart, or liver disease and have to limit fluids, talk with your doctor before you increase the amount of fluids you drink.   · Do not take more than the recommended daily dose of vitamins C and D.  · Limit the salt in your diet. · Eat a balanced diet that is not too high in protein. Follow-up care is a key part of your treatment and safety. Be sure to make and go to all appointments, and call your doctor if you are having problems. It's also a good idea to know your test results and keep a list of the medicines you take. Where can you learn more? Go to http://reza-kate.info/. Enter C138 in the search box to learn more about \"Learning About Diet for Kidney Stone Prevention. \"  Current as of: May 12, 2017  Content Version: 11.4  © 5029-9566 [employer]. Care instructions adapted under license by [employer] (which disclaims liability or warranty for this information). If you have questions about a medical condition or this instruction, always ask your healthcare professional. [place] [address] any warranty or liability for your use of this information. Kidney Stone: After Your Visit to the Emergency Room  Your Care Instructions  Kidney stones form when salts, minerals, and other substances in the urine clump together. A kidney stone can be as small as a grain of sand or, rarely, as large as a golf ball. The stone travels through the ureter, the tube that carries urine from the kidney to the bladder. It can take at least 48 hours for a kidney stone to pass through your system and leave your body when you urinate. It may take as long as 2 weeks. During this time, you may have pain and some blood in your urine. Treatment can help you pass a large kidney stone. Even though you have been released from the emergency room, you still need to watch for any problems. The doctor carefully checked you. But sometimes problems can develop later. If you have new symptoms, or if your symptoms do not get better, return to the emergency room or call your doctor right away.   A visit to the emergency room is only one step in your treatment. Even if you feel better, you still need to do what your doctor recommends, such as going to all suggested follow-up appointments and taking medicines as directed. This will help you recover and help prevent future problems. How can you care for yourself at home? · Drink plenty of fluids, enough so that your urine is light yellow or clear like water. If you have kidney, heart, or liver disease and have to limit fluids, talk with your doctor before you increase the amount of fluids you drink. · Take pain medicines exactly as directed. Call your doctor if you think you are having a problem with your medicine. ¨ If the doctor gave you a prescription medicine for pain, take it as prescribed. ¨ If you are not taking a prescription pain medicine, ask your doctor if you can take an over-the-counter medicine. Read and follow all instructions on the label. · Your doctor may ask you to strain your urine so that you can collect your kidney stone when it passes. You can use a kitchen strainer or a tea strainer to catch the stone. Preventing future kidney stones  Some changes in your diet may help prevent kidney stones. Depending on the cause of your stones, your doctor may recommend that you:  · Drink plenty of fluids, enough so that your urine is light yellow or clear like water. If you have kidney, heart, or liver disease and have to limit fluids, talk with your doctor before you increase the amount of fluids you drink. · Avoid coffee, tea, and alcohol. Also avoid grapefruit juice. · Do not take more than the recommended daily dose of vitamins C and D.  · Avoid antacids such as Tums, Gaviscon, Mylanta, or Maalox. · Limit the amount of salt (sodium) in your diet. · Eat a balanced diet that is not too high in protein. · Avoid foods that are high in a substance called oxalate, which can cause kidney stones.  These foods include dark green vegetables, rhubarb, chocolate, wheat bran, nuts, cranberries, and beans.  When should you call for help? Return to the emergency room now if:  · You are vomiting and cannot keep fluids down. · You have severe pain that is not controlled by pain medicine. · You have a fever. · Your pain has not gone away after 3 days. · You have new or more blood in your urine. · You are not able to pass urine. Where can you learn more? Go to [employer]  Enter B615 in the search box to learn more about \"Kidney Stone: After Your Visit to the Emergency Room. \"   © 6806-9275 [employer]. Care instructions adapted under license by [facility] (which disclaims liability or warranty for this information). This care instruction is for use with your licensed healthcare professional. If you have questions about a medical condition or this instruction, always ask your healthcare professional. [address] any warranty or liability for your use of this information. Content Version: 9.4.95094;  Last Revised: July 27, 2010

## 2018-06-29 ENCOUNTER — OFFICE VISIT (OUTPATIENT)
Dept: BEHAVIORAL/MENTAL HEALTH CLINIC | Age: 28
End: 2018-06-29

## 2018-06-29 VITALS
SYSTOLIC BLOOD PRESSURE: 90 MMHG | BODY MASS INDEX: 34.6 KG/M2 | HEIGHT: 62 IN | DIASTOLIC BLOOD PRESSURE: 68 MMHG | HEART RATE: 88 BPM | WEIGHT: 188 LBS

## 2018-06-29 DIAGNOSIS — Z86.59 HISTORY OF ADHD: ICD-10-CM

## 2018-06-29 DIAGNOSIS — F41.1 GENERALIZED ANXIETY DISORDER: ICD-10-CM

## 2018-06-29 DIAGNOSIS — F12.10 CANNABIS ABUSE: ICD-10-CM

## 2018-06-29 DIAGNOSIS — Z86.59 HISTORY OF BIPOLAR DISORDER: ICD-10-CM

## 2018-06-29 DIAGNOSIS — F33.41 RECURRENT MAJOR DEPRESSIVE DISORDER, IN PARTIAL REMISSION (HCC): Primary | ICD-10-CM

## 2018-06-29 RX ORDER — ATOMOXETINE 10 MG/1
10 CAPSULE ORAL
Qty: 30 CAP | Refills: 2 | Status: SHIPPED | OUTPATIENT
Start: 2018-06-29 | End: 2018-08-08 | Stop reason: SDUPTHER

## 2018-06-29 RX ORDER — LAMOTRIGINE 150 MG/1
300 TABLET ORAL DAILY
Qty: 180 TAB | Refills: 2 | Status: SHIPPED | OUTPATIENT
Start: 2018-06-29 | End: 2019-01-14 | Stop reason: SDUPTHER

## 2018-06-29 NOTE — PROGRESS NOTES
Kevin Malloy  1990  33 y.o.  female  [place] [place]     Chief Complaint   Patient presents with    Behavioral Problem    Anxiety    Insomnia       [place]:   This is a 33years old   female with past psychiatric history and treatment of bipolar disorder and ADHD who presented today to establish care as she moved back from [place] 6 months ago and requested medication management. She had all of her medication bottles with her so medication we conciliation was done and she had some record from her psychiatrist at [address] behavioral health care since 2009 and also a sheet with all of her medication regimens starting at age 1 both of the documents on the scan in her EMR. Patient presented on time, was alert awake oriented to time person and place and was calm and cooperative, polite, and pleasant during the interview. She reported compliance with Lamictal and take as needed clonidine couple of times a week and as needed Ambien couple of times a month, is able to tolerate all, and requested to continue current treatment plan but requested help with her ADHD symptoms especially now she has a job which requires her to be attentive otherwise she will lose her job. Patient was able to get appointment for neuropsych testing in November but as she need ADHD medication now we decided to try Strattera 10 mg daily and continue rest of the treatment plan. She was provided with support and psychoeducation, and after discussing risk/benefits/expectations with treatment alternatives available, patient decided to continue current treatment plan except for restarting Adderall which gives her side effect and look like that she does not even require at this time.        SUBSTANCE USE HISTORY:   TOBACCO: Started to smoke at age 22 and smoke up to one pack per day till about a year ago when she started to use E cigarettes.   ALCOHOL: Patient reported history of severe and heavy drinking about half a gallon a day every day for 5 years between age [age]. She denies any abuse at this time but her liver function enzymes are still up. CANNABIS: Patient reported history of smoking 10 times a day between age [age] but currently only smoke 1 cigarette every couple of a day which helps her with anxiety. COCAINE: Patient denies. OPIOIDS: She accepted to abuse pain medications but currently denies. OTHERS: She had tried ecstasy. MEDICAL HISTORY:    has a past medical history of Chronic kidney disease. ALLERGIES:   No Known Allergies    VITAL SIGNS:  BP 90/68  Pulse 88  Ht 5' 2\" (1.575 m)  Wt 85.3 kg (188 lb)  BMI 34.39 kg/m2    Current Outpatient Prescriptions   Medication Sig Dispense Refill    lamoTRIgine (LAMICTAL) 150 mg tablet Take 2 Tabs by mouth daily. Indications: BIPOLAR DISORDER IN REMISSION 180 Tab 2    atomoxetine (STRATTERA) 10 mg capsule Take 1 Cap by mouth every morning. Indications: Attention-Deficit Hyperactivity Disorder 30 Cap 2    ketorolac (TORADOL) 10 mg tablet Take 1 Tab by mouth every six (6) hours as needed for Pain. 20 Tab 0    cloNIDine HCl (CATAPRES) 0.1 mg tablet Take 0.1 mg by mouth as needed. Indications: anxiety           MENTAL STATUS EXAMINATION:   Patient is a 33 y.o. female [facility] who looks her stated age. Patient appearance is nicely dressed with good hygiene. Speech is regular rate and rhythm, fluent language, and thought process is linear, logical, and goal directed. Reported mood is fine with mood congruent euthymic affect. Patient denies any suicidal ideation, homicidal ideation, and auditory visual hallucination. Not observed to be delusional or paranoid. Insight, judgement, reliability and impulse control is limited to intact. Her Cognition is within normal limit. DIAGNOSIS:  Encounter Diagnoses   Name Primary?     Recurrent major depressive disorder, in partial remission ([facility] [address] Yes    History of bipolar disorder     History of ADHD     Generalized anxiety disorder     Cannabis abuse        PLAN:  1. MEDICATION: Patient report compliance with Lamictal 150 mg twice a day, and is taking clonidine 0.1 mg only as needed but her main complaint today was ADHD symptoms especially with her job and she has appointment for neuropsych testing in November and agreed to try Strattera 10 mg daily and return in 4 weeks for reevaluation his stated age. She is taking Ambien 10 mg as needed for insomnia. Patient was provided with psycho education, discussed risk/benefits, and expectations from medication changes. Patient agrees with plan. 2. PSYCHOTHERAPY: Patient was provided with supportive therapy, strongly encourage to seek psychotherapy. 3. MEDICAL CARE: Patient was strongly encourage to take their medical medications and follow up with their PCP on regular basis. 4. SUBSTANCE ABUSE CARE: Patient uses e-cigarette, denies alcohol abuse, and have cut down on marijuana and now is smoke 1 cigarette every couple of days. 5. FOLLOW UP:   Follow-up Disposition:  Return in about 4 weeks (around 7/27/2018) for Medication management.

## 2018-06-29 NOTE — MR AVS SNAPSHOT
[address] [facility] 
[phone] Patient: Amos Bradford MRN: [id number] Visit Information Date & Time Provider Department Dept. Phone Encounter #  
 6/29/2018 10:30 AM Belinda Jaimes MD [facility] [phone] [id number] Your Appointments 8/8/2018  3:00 PM  
ESTABLISHED PATIENT with Belinda Jaimes MD  
[facility] [address]) Appt Note: 6 wk fu  
 [address] [facility] [address] [phone]  
[address] [facility] [address] [phone]  
  
    
 11/1/2018  8:40 AM  
New Patient with Stefania Guzman, PsyD [facility] ([address]) Appt Note: np/ADHD,  bipolar [address] [place] [address] [facility] [address] [phone]  
  
   
 [address] [place] [address] [address] [address] Upcoming Health Maintenance Date Due Pneumococcal 19-64 Medium Risk (1 of 1 - PPSV23) 2/25/2008 PAP AKA CERVICAL CYTOLOGY 2/25/2010 Influenza Age 5 to Adult 8/1/2018 DTaP/Tdap/Td series (2 - Td) 8/23/2023 Allergies as of 6/29/2018  Review Complete On: 6/29/2018 By: Milka Bajwa No Known Allergies Current Immunizations  Never Reviewed Name Date Tdap 8/23/2013  9:26 PM  
  
 Not reviewed this visit Vitals BP Pulse Height(growth percentile) Weight(growth percentile) BMI OB Status 90/68 88 5' 2\" (1.575 m) 188 lb (85.3 kg) 34.39 kg/m2 Having regular periods Smoking Status Current Every Day Smoker Vitals History BMI and BSA Data Body Mass Index Body Surface Area  
 34.39 kg/m 2 1.93 m 2 Preferred Pharmacy Pharmacy Name Phone Mely Diana [address] [place] [address] Dr [place], [address] [facility] [address] [place] [address]  [address] [phone] Your Updated Medication List  
  
   
This list is accurate as of 6/29/18 10:37 AM.  Always use your most recent med list.  
  
  
  
  
 atomoxetine 10 mg capsule Commonly known as:  STRATTERA Take 1 Cap by mouth every morning. Indications: Attention-Deficit Hyperactivity Disorder  
  
 cloNIDine HCl 0.1 mg tablet Commonly known as:  CATAPRES Take 0.1 mg by mouth as needed. Indications: anxiety  
  
 ketorolac 10 mg tablet Commonly known as:  TORADOL Take 1 Tab by mouth every six (6) hours as needed for Pain.  
  
 lamoTRIgine 150 mg tablet Commonly known as: LaMICtal  
Take 2 Tabs by mouth daily. Indications: BIPOLAR DISORDER IN REMISSION Prescriptions Sent to Pharmacy Refills  
 lamoTRIgine (LAMICTAL) 150 mg tablet 2 Sig: Take 2 Tabs by mouth daily. Indications: BIPOLAR DISORDER IN REMISSION Class: Normal  
 Pharmacy: [address] Ph #: [phone] Route: Oral  
 atomoxetine (STRATTERA) 10 mg capsule 2 Sig: Take 1 Cap by mouth every morning. Indications: Attention-Deficit Hyperactivity Disorder Class: Normal  
 Pharmacy: [address] Ph #: [phone] Route: Oral  
  
Introducing [facility] SERVICES! Megan Stubbs introduces [employer] patient portal. Now you can access parts of your medical record, email your doctor's office, and request medication refills online. 1. In your internet browser, go to [email or web address]. [employer] 2. Click on the First Time User? Click Here link in the Sign In box. You will see the New Member Sign Up page. 3. Enter your [employer] Access Code exactly as it appears below. You will not need to use this code after youve completed the sign-up process. If you do not sign up before the expiration date, you must request a new code. · [employer] Access Code: [email or web address] Expires: 9/27/2018 10:28 AM 
 
 4. Enter the last four digits of your Social Security Number (xxxx) and Date of Birth (mm/dd/yyyy) as indicated and click Submit. You will be taken to the next sign-up page. 5. Create a mobile [employer] ID. This will be your mobile [employer] login ID and cannot be changed, so think of one that is secure and easy to remember. 6. Create a mobile [employer] password. You can change your password at any time. 7. Enter your Password Reset Question and Answer. This can be used at a later time if you forget your password. 8. Enter your e-mail address. You will receive e-mail notification when new information is available in [address] 9. Click Sign Up. You can now view and download portions of your medical record. 10. Click the Download Summary menu link to download a portable copy of your medical information. If you have questions, please visit the Frequently Asked Questions section of the mobile [employer] website. Remember, mobile [employer] is NOT to be used for urgent needs. For medical emergencies, dial 911. Now available from your iPhone and Android! Please provide this summary of care documentation to your next provider. Your primary care clinician is listed as Chloe Garcia. If you have any questions after today's visit, please call [phone].

## 2018-08-08 ENCOUNTER — OFFICE VISIT (OUTPATIENT)
Dept: BEHAVIORAL/MENTAL HEALTH CLINIC | Age: 28
End: 2018-08-08

## 2018-08-08 VITALS
DIASTOLIC BLOOD PRESSURE: 71 MMHG | HEIGHT: 62 IN | SYSTOLIC BLOOD PRESSURE: 100 MMHG | HEART RATE: 81 BPM | WEIGHT: 184 LBS | BODY MASS INDEX: 33.86 KG/M2

## 2018-08-08 DIAGNOSIS — F41.1 GENERALIZED ANXIETY DISORDER: Primary | ICD-10-CM

## 2018-08-08 DIAGNOSIS — F12.10 CANNABIS ABUSE: ICD-10-CM

## 2018-08-08 DIAGNOSIS — Z86.59 HISTORY OF BIPOLAR DISORDER: ICD-10-CM

## 2018-08-08 DIAGNOSIS — Z86.59 HISTORY OF ADHD: ICD-10-CM

## 2018-08-08 RX ORDER — ATOMOXETINE 40 MG/1
40 CAPSULE ORAL
Qty: 30 CAP | Refills: 2 | Status: SHIPPED | OUTPATIENT
Start: 2018-08-08 | End: 2019-02-05 | Stop reason: ALTCHOICE

## 2018-08-08 NOTE — PROGRESS NOTES
[facility]  1990  33 y.o.  female  [facility]    Chief Complaint   Patient presents with    Behavioral Problem    Depression    Anxiety       [place]:   This is a 33years old   female with past psychiatric history and treatment of bipolar disorder and ADHD who was seen for the first time on June 29, 2018 when she presented to \[facility]\"" care as she moved back from [place] 6 months ago and requested medication management. [facility] had all of her medication bottles with her so medication we conciliation was done and she had some record from her psychiatrist at [facility] since 2009 and also a sheet with all of her medication regimens starting at age 1 both of the documents on the scan in her EMR. She decided to try Strattera 10 mg daily and continue rest of the treatment plan and return in 4 weeks for reevaluation.     Patient presented on time, was alert awake oriented to time person and place and was calm and cooperative, polite, and pleasant during the interview.  She reported compliance with Lamictal and Strattera 10 mg daily and has not required clonidine since last seen. She is able to tolerate, and requested to increase the Strattera as it does help her completely but effects does not last more than 4 hours and she is now working 2 jobs and will require something longer acting. She is a scheduled for neuropsych testing in November and was provided with list of providers in area who can see her earlier. She was provided with support and psychoeducation. She denies any other substance abuse and accepted to smoke marijuana 2-3 times a week.          SUBSTANCE USE HISTORY:   TOBACCO: Started to smoke at age 22 and smoke up to one pack per day till about a year ago when she started to use E cigarettes.   ALCOHOL: Patient reported history of severe and heavy drinking about half a gallon a day every day for 5 years between age [age]. [facility] denies any abuse at this time but her liver function enzymes are still up. CANNABIS: Patient reported history of smoking 10 times a day between age [age] but currently only smoke 1 cigarette every couple of a day which helps her with anxiety. COCAINE: Patient denies. OPIOIDS: She accepted to abuse pain medications but currently denies.    OTHERS: She had tried ecstasy. MEDICAL HISTORY:    has a past medical history of Chronic kidney disease. ALLERGIES:   No Known Allergies    VITAL SIGNS:  /71  Pulse 81  Ht 5' 2\" (1.575 m)  Wt 83.5 kg (184 lb)  BMI 33.65 kg/m2    Current Outpatient Prescriptions   Medication Sig Dispense Refill    atomoxetine (STRATTERA) 40 mg capsule Take 1 Cap by mouth every morning. Indications: Attention-Deficit Hyperactivity Disorder 30 Cap 2    lamoTRIgine (LAMICTAL) 150 mg tablet Take 2 Tabs by mouth daily. Indications: BIPOLAR DISORDER IN REMISSION 180 Tab 2    cloNIDine HCl (CATAPRES) 0.1 mg tablet Take 0.1 mg by mouth as needed. Indications: anxiety         MENTAL STATUS EXAMINATION:   Patient is a 33 y.o. female [facility] who looks her stated age. Patient appearance is casually dressed with fair hygiene. Speech is regular rate and rhythm, fluent language, and thought process is linear, logical, and goal directed. Reported mood is okay with mood congruent euthymic affect. Patient denies any suicidal ideation, homicidal ideation, and auditory visual hallucination. Not observed to be delusional or paranoid. Insight, judgement, reliability and impulse control is limited to intact. Her cognition is within normal limit and she is observed to be reliable. DIAGNOSIS:  Encounter Diagnoses   Name Primary?  Generalized anxiety disorder Yes    History of bipolar disorder     History of ADHD     Cannabis abuse        PLAN:  1. MEDICATION: Increase atomoxetine 40 mg daily. Continue lamotrigine 150 mg twice a day and clonidine 0.1 mg as needed which she has not used since last seen. Patient was provided with psycho education, discussed risk/benefits, and expectations from medication changes. Patient agrees with plan. 2. PSYCHOTHERAPY: Patient was provided with supportive therapy, strongly encourage to seek psychotherapy. 3. MEDICAL CARE: Patient was strongly encourage to take their medical medications and follow up with their PCP on regular basis. She lost 4 pounds since last seen about 5 weeks ago and currently way 184 pounds. 4. SUBSTANCE ABUSE CARE: Patient is smoke half pack per day, denies drinking alcohol and denies doing any drugs except for smoking marijuana 2-3 times a week    5. FOLLOW UP:   Follow-up Disposition:  Return in about 6 weeks (around 9/19/2018) for Medication management.

## 2018-08-08 NOTE — MR AVS SNAPSHOT
[address] [place] [id number] Patient: Vern Solano MRN: [id number] Visit Information Date & Time Provider Department Dept. Phone Encounter #  
 8/8/2018  3:00 PM Martha Malloy, [address] [facility] [phone] [id number] Your Appointments 11/1/2018  8:40 AM  
New Patient with Urmila Reynoso, PsyD [address] ([address]) Appt Note: np/ADHD,  bipolar [place] [address] [facility] [address] [facility] [id number] [phone]  
  
   
 [place] [address] [id number] [address] Upcoming Health Maintenance Date Due Pneumococcal 19-64 Medium Risk (1 of 1 - PPSV23) 2/25/2008 PAP AKA CERVICAL CYTOLOGY 2/25/2010 Influenza Age 5 to Adult 8/1/2018 DTaP/Tdap/Td series (2 - Td) 8/23/2023 Allergies as of 8/8/2018  Review Complete On: 8/8/2018 By: Dayana Salgado No Known Allergies Current Immunizations  Never Reviewed Name Date Tdap 8/23/2013  9:26 PM  
  
 Not reviewed this visit Vitals BP Pulse Height(growth percentile) Weight(growth percentile) BMI OB Status 100/71 81 5' 2\" (1.575 m) 184 lb (83.5 kg) 33.65 kg/m2 Having regular periods Smoking Status Current Every Day Smoker BMI and BSA Data Body Mass Index Body Surface Area  
 33.65 kg/m 2 1.91 m 2 Preferred Pharmacy Pharmacy Name Phone Torie Hives [address] [place] [address] Dr Mercer, [address] [facility] [place] [phone] Your Updated Medication List  
  
   
This list is accurate as of 8/8/18  3:26 PM.  Always use your most recent med list.  
  
  
  
  
 atomoxetine 40 mg capsule Commonly known as:  STRATTERA Take 1 Cap by mouth every morning. Indications: Attention-Deficit Hyperactivity Disorder  
  
 cloNIDine HCl 0.1 mg tablet Commonly known as:  CATAPRES Take 0.1 mg by mouth as needed. Indications: anxiety  
  
 lamoTRIgine 150 mg tablet Commonly known as: LaMICtal  
Take 2 Tabs by mouth daily. Indications: BIPOLAR DISORDER IN REMISSION Prescriptions Sent to Pharmacy Refills  
 atomoxetine (STRATTERA) 40 mg capsule 2 Sig: Take 1 Cap by mouth every morning. Indications: Attention-Deficit Hyperactivity Disorder Class: Normal  
 Pharmacy: [address] Mercer, [address]  [address]  #: [phone] Route: Oral  
  
Introducing [facility]! [address] [facility] introduces [employer] patient portal. Now you can access parts of your medical record, email your doctor's office, and request medication refills online. 1. In your internet browser, go to [email or web address] 2. Click on the First Time User? Click Here link in the Sign In box. You will see the New Member Sign Up page. 3. Enter your [employer] Access Code exactly as it appears below. You will not need to use this code after youve completed the sign-up process. If you do not sign up before the expiration date, you must request a new code. · Awesome [employer] Access Code: [email or web address] Expires: 9/27/2018 10:28 AM 
 
4. Enter the last four digits of your Social Security Number (xxxx) and Date of Birth (mm/dd/yyyy) as indicated and click Submit. You will be taken to the next sign-up page. 5. Create a Awesome Maps ID. This will be your Awesome Maps login ID and cannot be changed, so think of one that is secure and easy to remember. 6. Create a [employer] password. You can change your password at any time. 7. Enter your Password Reset Question and Answer. This can be used at a later time if you forget your password. 8. Enter your e-mail address. You will receive e-mail notification when new information is available in [address] 9. Click Sign Up. You can now view and download portions of your medical record. 10. Click the Download Summary menu link to download a portable copy of your medical information. If you have questions, please visit the Frequently Asked Questions section of the [facility] website. Remember, [facility] is NOT to be used for urgent needs. For medical emergencies, dial 911. Now available from your iPhone and Android! Please provide this summary of care documentation to your next provider. Your primary care clinician is listed as Chemo Abdalla. If you have any questions after today's visit, please call [phone].

## 2018-11-01 ENCOUNTER — OFFICE VISIT (OUTPATIENT)
Dept: NEUROLOGY | Age: 28
End: 2018-11-01

## 2018-11-01 DIAGNOSIS — F41.9 SEVERE ANXIETY: ICD-10-CM

## 2018-11-01 DIAGNOSIS — F10.21 HISTORY OF ALCOHOL DEPENDENCE (HCC): ICD-10-CM

## 2018-11-01 DIAGNOSIS — R45.86 MOOD SWINGS: Primary | ICD-10-CM

## 2018-11-01 DIAGNOSIS — Z81.1 FAMILY HISTORY OF ALCOHOLISM: ICD-10-CM

## 2018-11-01 DIAGNOSIS — Z81.8 FAMILY HISTORY OF DEPRESSION: ICD-10-CM

## 2018-11-01 DIAGNOSIS — R45.87 IMPULSIVE: ICD-10-CM

## 2018-11-01 DIAGNOSIS — R41.840 INATTENTION: ICD-10-CM

## 2018-11-01 DIAGNOSIS — Z91.51 HISTORY OF SUICIDE ATTEMPT: ICD-10-CM

## 2018-11-01 NOTE — PROGRESS NOTES
[address] [facility],[address]  [address]   [place] [address] [facility]   Enid Cardenas    [phone] Office   [phone] Fax      Neuropsychology    Initial Diagnostic Interview Note      Referral:  Agatha Browning MD,  Rick Roth is a 33 y.o. left handed   female who was unaccompanied to the initial clinical interview on 11/1/18 . Please refer to her medical records for details pertaining to her history. Briefly, the patient reported that she completed one year of college and during school she had an IEP for ADHD and ED. She does have a copy of all of those records. She was tried on various medications when younger. She was diagnosed with Bipolar Disorder at age 5. She was on Lithium, Ritalin, and numerous other medications. She was diagnosed at a psychiatric hospital in [place] that has since been torn down. 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 She has marked problems with focus and attention and these are ongoing issues. Mood is up and down. She is followed by psychiatry. Strattera was upped to 40 mg which has been making her sick so she hasn't been taking it and attention is off so she goes back to see him soon. She has been on Lamictal since age 24. She finds it to be unhelpful anymore, and that needs to be clarified also. She has seen psychiatry previously here and in [place]. She was diagnosed with ADHD at age 1 and depression age 9 and bipolar at age 5. She has been sober for about a year now. She does continue to use marijuana which helps with her anxiety quite a bit and was living in [place] and legal there. She has agreed to do neuropsych testing. She struggles with depression, anhedonia, feeling tired, and trouble concentrating and several days of overeating and feeling bad about herself. She has not really identified manic episodes.   She has been psychiatrically hospitalized in the past, and had been drinking heavily throughout.       Her first psychiatric hospitalization was in [address] [place] [address] and second in [address] [place] [address], both were deemed manic episodes but she was on She was on lithium, Prozac, Ritalin, Depakote, and risperidone. She was off of all of her medication between 6910-2272 and she attempted her only suicide attempt in 2004 and was hospitalized at [facility] after she did an serious overdose and she was severely depressed at bedtime and she was cutting herself and she said that she was not abusing drugs at that time but is started to abuse few years later. She was on Wellbutrin in 2004 and her last hospitalization is at [facility] in 2005 when she was switched to Depakote, trazodone, Adderall. In 2006 BuSpar was added and then again in 2007 she went off of the medications and in 2009 she was started on her current regimen of Lamictal, Ambien, clonidine and she is taking since then. Alcoholism very strong on the mother's side, and there is a strong maternal family history of depression and anxiety type concerns. Brother with autism. The patient began smoking cigarettes at age 22, up to 1 ppd and switched to e-cigarettes about a year ago. She has heavy drinking in her history including drinking about a 1/2 gallon every day for five years, and has been sober for one year. She was smoking marijuana up to 10 times per day from age [age], and now that has cut back significantly. Was obtaining it legally in [facility].   She has abused opioid pain meds in the past but denied current, and has tried ecstasy.       Neuropsychological Mental Status Exam (NMSE):  Historian: Good  Praxis: No UE apraxia  R/L Orientation: Intact to self and to other  Dress: within normal limits   Weight: Appears grossly normal  Appearance/Hygiene: within normal limits   Gait: within normal limits   Assistive Devices: Glasses  Mood: within normal limits   Affect: within normal limits   Comprehension: within normal limits   Thought Process: within normal limits   Expressive Language: within normal limits   Receptive Language: within normal limits   Motor:  No cognitive or motor perseveration  ETOH: denied  Tobacco:  E cigarrettes occasionally, counseling to quit given  Illicit: Marijuana occasionally  SI/HI: Denied  Psychosis: Denied  Insight: Within normal limits  Judgment: Within normal limits  Other Psych:      Past Medical History:   Diagnosis Date    Chronic kidney disease     UTI, kidney stones       History reviewed. No pertinent surgical history. No Known Allergies    History reviewed. No pertinent family history. Social History     Tobacco Use    Smoking status: Current Every Day Smoker     Last attempt to quit: 2012     Years since quittin.9    Smokeless tobacco: Never Used    Tobacco comment: e cig - 1 cartridge a day   Substance Use Topics    Alcohol use: No    Drug use: No       Current Outpatient Medications   Medication Sig Dispense Refill    atomoxetine (STRATTERA) 40 mg capsule Take 1 Cap by mouth every morning. Indications: Attention-Deficit Hyperactivity Disorder 30 Cap 2    lamoTRIgine (LAMICTAL) 150 mg tablet Take 2 Tabs by mouth daily. Indications: BIPOLAR DISORDER IN REMISSION 180 Tab 2    cloNIDine HCl (CATAPRES) 0.1 mg tablet Take 0.1 mg by mouth as needed. Indications: anxiety           Plan:  Obtain authorization for testing from insurance company. Report to follow once testing, scoring, and interpretation completed. ? Organic based neurocognitive issues versus mood disorder or combination of same. ? Problems organic, functional, or both? This note will not be viewable in [address]       33year old with a complex psychiatric and cognitive history presents for testing on referral by psychiatrist as there is so much overlap between the mood swings, anxiety, and ADHD type symptoms along with a history of extensive substance abuse currently in remission. Eval for organic based cognitive concerns versus mood versus combination of same.

## 2018-11-27 ENCOUNTER — OFFICE VISIT (OUTPATIENT)
Dept: NEUROLOGY | Age: 28
End: 2018-11-27

## 2018-11-27 DIAGNOSIS — R45.86 MOOD SWINGS: ICD-10-CM

## 2018-11-27 DIAGNOSIS — F10.21 HISTORY OF ALCOHOL DEPENDENCE (HCC): ICD-10-CM

## 2018-11-27 DIAGNOSIS — Z81.1 FAMILY HISTORY OF ALCOHOLISM: ICD-10-CM

## 2018-11-27 DIAGNOSIS — F90.0 ATTENTION DEFICIT HYPERACTIVITY DISORDER (ADHD), INATTENTIVE TYPE, MODERATE: ICD-10-CM

## 2018-11-27 DIAGNOSIS — Z91.51 HISTORY OF SUICIDE ATTEMPT: ICD-10-CM

## 2018-11-27 DIAGNOSIS — F12.90 MARIJUANA USE, EPISODIC: ICD-10-CM

## 2018-11-27 DIAGNOSIS — F41.9 MODERATE ANXIETY: ICD-10-CM

## 2018-11-27 DIAGNOSIS — Z81.8 FAMILY HISTORY OF DEPRESSION: ICD-10-CM

## 2018-11-27 DIAGNOSIS — F32.1 MODERATE MAJOR DEPRESSION (HCC): Primary | ICD-10-CM

## 2018-11-28 NOTE — PROGRESS NOTES
[address] [facility],[address]  [address]   [place] [address] [place] [facility]   Enid Cardenas    [phone] Office   [phone] Fax      Psychological Evaluation Report  Referral:  Larry Hoff MD, Dr. HassanBreonnahardy Estrada is a 33 y.o. left handed   female who was unaccompanied to the initial clinical interview on 11/1/18 . Please refer to her medical records for details pertaining to her history. Briefly, the patient reported that she completed one year of college and during school she had an IEP for ADHD and ED. She does have a copy of all of those records. She was tried on various medications when younger. She was diagnosed with Bipolar Disorder at age 5. She was on Lithium, Ritalin, and numerous other medications. She was diagnosed at a psychiatric hospital in [place] that has since been torn down. 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 She has marked problems with focus and attention and these are ongoing issues. Mood is up and down. She is followed by psychiatry. Strattera was upped to 40 mg which has been making her sick so she hasn't been taking it and attention is off so she goes back to see him soon. She has been on Lamictal since age 24. She finds it to be unhelpful anymore, and that needs to be clarified also. She has seen psychiatry previously here and in [place]. She was diagnosed with ADHD at age 1 and depression age 9 and bipolar at age 5. She reports being sober for about a year now. She does continue to use marijuana which helps with her anxiety quite a bit and was living in [place] and legal there. She has agreed to do neuropsych testing. She struggles with depression, anhedonia, feeling tired, and trouble concentrating and several days of overeating and feeling bad about herself. She has not really identified manic episodes.   She has been psychiatrically hospitalized in the past, and had been drinking heavily throughout.       Her first psychiatric hospitalization was in [address] [place] [address] and second in [address] [place] [address], both were deemed manic episodes but she was on She was on lithium, Prozac, Ritalin, Depakote, and risperidone. She was off of all of her medication between [id number] and she attempted her only suicide attempt in 2004 and was hospitalized at [facility] after she did an serious overdose and she was severely depressed at bedtime and she was cutting herself and she said that she was not abusing drugs at that time but is started to abuse few years later. She was on Wellbutrin in 2004 and her last hospitalization is at [facility] in 2005 when she was switched to Depakote, trazodone, Adderall. In 2006 BuSpar was added and then again in 2007 she went off of the medications and in 2009 she was started on her current regimen of Lamictal, Ambien, clonidine and she is taking since then. Alcoholism very strong on the mother's side, and there is a strong maternal family history of depression and anxiety type concerns. Brother with autism. The patient began smoking cigarettes at age 22, up to 1 ppd and switched to e-cigarettes about a year ago. She has heavy drinking in her history including drinking about a 1/2 gallon every day for five years, and has been sober for one year. She was smoking marijuana up to 10 times per day from age [age], and now that has cut back significantly. Was obtaining it legally in [facility].   She has abused opioid pain meds in the past but denied current, and has tried ecstasy.       Neuropsychological Mental Status Exam (NMSE):  Historian: Good  Praxis: No UE apraxia  R/L Orientation: Intact to self and to other  Dress: within normal limits   Weight: Appears grossly normal  Appearance/Hygiene: within normal limits   Gait: within normal limits   Assistive Devices: Glasses  Mood: within normal limits   Affect: within normal limits Comprehension: within normal limits   Thought Process: within normal limits   Expressive Language: within normal limits   Receptive Language: within normal limits   Motor:  No cognitive or motor perseveration  ETOH: denied  Tobacco:  E cigarrettes occasionally, counseling to quit given  Illicit: Marijuana occasionally  SI/HI: Denied  Psychosis: Denied  Insight: Within normal limits  Judgment: Within normal limits  Other Psych:      Past Medical History:   Diagnosis Date    Chronic kidney disease     UTI, kidney stones       History reviewed. No pertinent surgical history. No Known Allergies    History reviewed. No pertinent family history. Social History     Tobacco Use    Smoking status: Current Every Day Smoker     Last attempt to quit: 2012     Years since quittin.9    Smokeless tobacco: Never Used    Tobacco comment: e cig - 1 cartridge a day   Substance Use Topics    Alcohol use: No    Drug use: No       Current Outpatient Medications   Medication Sig Dispense Refill    atomoxetine (STRATTERA) 40 mg capsule Take 1 Cap by mouth every morning. Indications: Attention-Deficit Hyperactivity Disorder 30 Cap 2    lamoTRIgine (LAMICTAL) 150 mg tablet Take 2 Tabs by mouth daily. Indications: BIPOLAR DISORDER IN REMISSION 180 Tab 2    cloNIDine HCl (CATAPRES) 0.1 mg tablet Take 0.1 mg by mouth as needed. Indications: anxiety           Plan:  Obtain authorization for testing from insurance company. Report to follow once testing, scoring, and interpretation completed. ? Organic based neurocognitive issues versus mood disorder or combination of same. ? Problems organic, functional, or both? This note will not be viewable in [address]       33year old with a complex psychiatric and cognitive history presents for testing on referral by psychiatrist as there is so much overlap between the mood swings, anxiety, and ADHD type symptoms along with a history of extensive substance abuse currently in remission. Eval for organic based cognitive concerns versus mood versus combination of same. Psychological Evaluation Results Follow  Patient Testing 11/27/18 Report Completed 11/28/18  A Psychometrist Assisted w/ portions of this evaluation while under my direct supervision    Neuropsychologist Administered, Interpreted, & Reported: Neuropsychological Mental Status Exam, Revised Memory & Behavior Checklist, MMSE, Clock Drawing, Test Of Premorbid Functioning, Ambika Kei Adult ADD Scales, History Taking  & Clinical Interview With The Patient, Manuel-Melzack Pain Questionnaire, Review Of Available Records*. Psychometrist Administered & Neuropsychologist Interpreted & Neuropsychologist Reported: Speech-Sounds Perception Test, SRT, Paced Serial Addition Test, Wechsler Adult Intelligence Scale - IV, Verbal Fluency Tests, Akira & Akira - Revised, Trailmaking Test Parts A & B, Buschke Selective Reminding Test, Dustin Complex Figure Test, Gipson Depression Inventory - II, Gipson Anxiety Inventory, Personality Assessment Inventory,    Computer Administered & Neuropsychologist Interpreted & Neuropsychologist Reported: Bonnie Continuous Performance Test - III      Test Findings:  Note:  The patients raw data have been compared with currently available norms which include demographic corrections for age, gender, and/or education. Sometimes, the patients scores are compared to demographically similar individuals as close to the patients age, education level, etc., as possible. \"Average\" is viewed as being +/- 1 standard deviation (SD) from the stated mean for a particular test score. \"Low average\" is viewed as being between 1 and 2 SD below the mean, and above average is viewed as being 1 and 2 SD above the mean. Scores falling in the borderline range (between 1-1/2 and 2 SD below the mean) are viewed with particular attention as to whether they are normal or abnormal neurocognitive test scores.   Other methods of inference in analyzing the test data are also utilized, including the pattern and range of scores in the profile, bilateral motor functions, and the presence, if any, of pathognomonic signs. Behaviorally, the patient was friendly and cooperative and appeared motivated to perform well during this examination. Within this context, the results of this evaluation are viewed as a valid reflection of the patients actual neurocognitive and emotional status. Her structured word list fluency, as assessed by the FAS Test, was within the mildly impaired range with a T score of 37. Category fluency was within the mildly impaired range with a T score of 36. Confrontation naming ability, as assessed by the [facility] - Revised, was within the mildly impaired range at 53/60 correct (T = 39). This pattern of performance is indicative of a patient who is at mildly increased risk for day-to-day problems with verbal fluency and confrontation naming. The patient's self reported score of 80 on the Marisel Kristy Adult ADD Scales was within the elevated risk range for ADD related concerns. The patient was administered the [facility] Continuous Performance Test - III, a computer-administered test of sustained attention, and review of the subscales within this instrument revealed mild to moderate concerns for inattentiveness without additional concern for impulsivity. Verbal auditory attention and discrimination, as assessed by the Speech-Sounds Perception Test (T = 48) and nonverbal auditory attention (T = 42) were within the normal range. High level auditory information processing speed, as assessed by the Paced Serial Addition Test, was within the normal range (- 1.313 SD) for Trial 2. This pattern of performance is indicative of a patient who is at increased risk for day-to-day problems with sustained visual attention/concentration.   Auditory attention and high level auditory information processing speed abilities were within normal limits. The patient was administered the Wechsler Adult Intelligence Scale - IV. See Appendix I for full breakdown of IQ test scores (scanned into media section of this EMR). As can be seen, there was no clinically significant difference between her low average range Working Memory Index score of 86 (18th %ile) and her low average range Processing Speed Index score of 86 (18th %ile). Her Verbal Comprehension Index score of 91 (27th %ile) was within the average range. Her Perceptual Reasoning Index score of 102 (55th %ile) was average. These scores are grossly commensurate with what would be expected based on her performance on a test assessing estimated premorbid levels of functioning. This pattern of performance is not indicative of a patient who is at increased risk for day-to-day problems with working memory and/or processing speed. The patient was administered the Buschke Selective Reminding Test and her basic learning and memory on this test (91/144) was within normal limits. In this regard, her efficiency related Consistent Long Term Recall score was impaired (34/144), especially when compared to her normal range Long Term Storage (85/144). Her discrepancy score (+ 51 points) on the Buschke Selective Reminding Test is clinically significant and is suggestive of a high level cognitive organization impairment and/or high level attention problem. Otherwise, her auditory memory abilities are within normal limits. The patients performance on the copy portion of the Dustin Complex Figure Test was within normal limits. Recall for the complex design was also within normal limits after both short and long delays. Recognition recall was normal.  This pattern of performance is not indicative of a patient who is at increased risk for day-to-day problems with visual organization and visual delayed memory.        Simple timed visual motor sequencing (Trailmaking Test Part A) was within the mildly impaired range with a T score of 35. Her performance on a similar, but more complex task of timed visual motor sequencing (Trailmaking Test Part B) was within the average range with a T score of 51. She made zero sequencing errors on this latter test.   Taken together, this pattern of performance is not indicative of a patient who is at increased risk for day-to-day problems with executive functioning. The patient rated her current level of pain as \"1/5 - Mild\" on the Manuel-Melzack Pain Questionnaire. She reported pain in her left foot. She also reported headaches and drowsiness. Her Gipson Depression Inventory -II score of 28 was within the moderately depressed range. Her Gipson Anxiety Inventory score of 25 reflected moderate anxiety. The patient was also administered the Personality Assessment Inventory and generated an invalid profile for interpretation due to a high level of patient response inconsistencies. Impressions & Recommendations:  From the actual neurocognitive profile, there is strong support for an inattentive form of ADHD. It is a mild to moderate issue. She is also showing problems with high level cognitive organization related abilities. IQ is normal.  Learning and memory abilities are normal.  Executive functioning abilities are normal.   From an emotional functioning standpoint, the patient reported moderate depression and anxiety on two brief self-report questionnaires. Lengthier assessment of personality functioning could not be further interpreted due to a high level of patient response inconsistencies. This is unfortunate, given the history of being diagnosed with Bipolar with possible medication interference when younger. The pattern of normal versus abnormal neurocognitive test scores suggests that ADHD is a separate issue from emotional distress.   The former is organic and the latter a combination of organic and functional factors. In addition to continued medical care, my recommendations include consideration for a 30-day trial of an appropriate attention related medication, if this is not medically contraindicated. During this trial, the patient should keep track of her response to this medication and provide the prescribing physician with feedback at the end of the month regarding its efficacy. Caution is advised in selecting a medication for attention, given her anxiety. She reports marijuana use as a coping mechanism for her anxiety. She reports being otherwise sober for about a year or more now. 100% sobriety is suggested. I also recommend strongly active engagement in intensive psychotherapy. Monitor for self-harmful thoughts/behaviors and address acutely. Continue follow up with psychiatry for treatment of mood and diagnostic clarification regarding bipolar is also suggested. Unfortunately, without the RUSTAM results, I cannot better clarify other than to say there is no evidence of an ADHD related impulsivity or hyperactivity type concern. Baseline now established. Follow up prn. Clinical correlation is, of course, indicated. I will discuss these findings with the patient when she follows up with me in the near future. A follow up Neuropsychological Evaluation is indicated on a prn basis, especially if there are any cognitive and/or emotional changes. DIAGNOSES: ADHD - Inattentive, Moderate    Anxiety, Moderate    Depression, Moderate    Rule out Bipolar Disorder     The above information is based upon information currently available to me. If there is any additional information of which I am currently unaware, I would be more than happy to review it upon having it made available to me. Thank you for the opportunity to see this interesting individual.     Sincerely,       Fidel Justice. Vianca Kwok, Alison    Cc: Chandana Story MD , Dr. Kat Rodriguez    2 units -12652-  1.5 hours. Record review.   Review of history provided by patient. Review of collaborative information. Testing by Clinician. Review of raw data. Scoring. Report writing of individual tests administered by Clinician. Integration of individual tests administered by psychometrist (that were previously reported and billed under psychometry code below) with testing by clinician and review of records/history/collaborative information. Case Conceptualization, Report writing. Coordination Of Care. 4 units  -41605 - 3.5 hours. Psychometrist test prep, administration, and scoring under clinician's direct supervision. Clinical interpretation of individual tests administered by psychometrist .  Clinician report of individual tests administered by psychometrist.    1 unit - 93860 -  45 minutes. Computer testing and scoring and clinician's interpretation of computer-administered test(s)    \"Unit\" is defined by CPT/National Guidelines (31 - 60 minutes). Integral services including scoring of raw data, data interpretation, case conceptualization, report writing etcetera were initiated after the patient finished testing/raw data collected and was completed on the date the report was signed.

## 2019-01-14 ENCOUNTER — OFFICE VISIT (OUTPATIENT)
Dept: BEHAVIORAL/MENTAL HEALTH CLINIC | Age: 29
End: 2019-01-14

## 2019-01-14 VITALS
BODY MASS INDEX: 34.78 KG/M2 | DIASTOLIC BLOOD PRESSURE: 71 MMHG | HEART RATE: 92 BPM | HEIGHT: 62 IN | SYSTOLIC BLOOD PRESSURE: 105 MMHG | WEIGHT: 189 LBS

## 2019-01-14 DIAGNOSIS — Z86.59 HISTORY OF ADHD: ICD-10-CM

## 2019-01-14 DIAGNOSIS — F12.10 CANNABIS ABUSE: ICD-10-CM

## 2019-01-14 DIAGNOSIS — F41.1 GENERALIZED ANXIETY DISORDER: Primary | ICD-10-CM

## 2019-01-14 DIAGNOSIS — Z86.59 HISTORY OF BIPOLAR DISORDER: ICD-10-CM

## 2019-01-14 RX ORDER — ATOMOXETINE 18 MG/1
18 CAPSULE ORAL
Qty: 30 CAP | Refills: 2 | Status: SHIPPED | OUTPATIENT
Start: 2019-01-14 | End: 2019-02-05 | Stop reason: SINTOL

## 2019-01-14 RX ORDER — LAMOTRIGINE 200 MG/1
400 TABLET ORAL DAILY
Qty: 60 TAB | Refills: 2 | Status: SHIPPED | OUTPATIENT
Start: 2019-01-14 | End: 2019-05-02 | Stop reason: SDUPTHER

## 2019-01-14 RX ORDER — CLONIDINE HYDROCHLORIDE 0.1 MG/1
0.1 TABLET ORAL 2 TIMES DAILY
Qty: 60 TAB | Refills: 2 | Status: SHIPPED | OUTPATIENT
Start: 2019-01-14 | End: 2019-10-02 | Stop reason: SDUPTHER

## 2019-01-14 NOTE — PROGRESS NOTES
James Shafer  1990  33 y.o.  female  [facility]    Chief Complaint   Patient presents with    Depression     Lamictal 300 mg is no longer working   [facility] Anxiety     Significant enough so she requires clonidine 0.1 mg 3-4 times a day    Behavioral Problem     She was able to tolerate Strattera 10 mg with benefits but was not able to tolerate 40 mg    Addiction problem     Accepted to smoke 1 cigarette of marijuana every day       Craig:   This is a 33years old   female with past psychiatric history and treatment of bipolar disorder and ADHD who was seen for the first time on June 29, 2018 when she presented to [facility] as she moved back from [place] 6 months ago and requested medication management. Bernie Noriega had all of her medication bottles with her so medication we conciliation was done and she had some record from her psychiatrist at [facility] since 2009 and also a sheet with all of her medication regimens starting at age 1 both of the documents on the scan in her EMR. She decided to try Strattera 10 mg daily and continue rest of the treatment plan and return in 4 weeks for reevaluation.     Patient was last seen on August 8 when she requested to increase Strattera as Strattera 10 mg helped her completely but it was lasting only for 4 hours and at that time she was working 2 jobs so we decided to increase and I suggested 18 mg or 25 mg but she decided to try 40 mg due to cost and agreed to return in 4 weeks but presented after 5 months due to financial problems. She was also able to see Dr. Maritza Matias for neuropsych testing in November. She presented on time, was alert awake oriented to time person and place and was calm and cooperative, polite, and pleasant during the interview.      Patient reported compliance with Lamictal 150 mg 2 tablets daily and stopped taking Strattera 40 mg due to nausea and report ADHD symptoms.   She is started to take clonidine 0.1 mg 2-3 times a day for significant anxiety and informed that she is having constant thoughts of self-harm but denies any active suicidal ideation, intent, plan and agreed to go to ER in case of emergency. She report depression and anxiety and requested to increase Lamictal so provided her with 200 mg and she will take 150 mg and 200 mg for the total dose of 350 mg daily and will go up to 400 mg daily if is not completely satisfied. Patient also agreed to retry Strattera 18 mg daily for at least 3 weeks and will call me to request if she need to go to 25 mg dose but given her good response to very low dose of 10 mg last time probably 18 mg will be sufficient enough. She denies any drinking or abusing any other illicit drugs but accepted to smoke 1 cigarette of marijuana regularly and was little bit evasive while talking about. She was provided with support and psychoeducation. She lives with her  and has no children and said that she is out of work since last Thursday as she act out at work and had an argument with coworker. She requested letter that she come to see me today.        SUBSTANCE USE HISTORY:   TOBACCO: Started to smoke at age 22 and smoke up to one pack per day till about a year ago when she started to use E cigarettes. ALCOHOL: Patient reported history of severe and heavy drinking about half a gallon a day every day for 5 years between age [age]. Ludmila López denies any abuse at this time but her liver function enzymes are still up. CANNABIS: Patient reported history of smoking 10 times a day between age [age] but currently only smoke 1 cigarette every couple of a day which helps her with anxiety. COCAINE: Patient denies. OPIOIDS: She accepted to abuse pain medications but currently denies.    OTHERS: She had tried ecstasy. MEDICAL HISTORY:    has a past medical history of Chronic kidney disease.     ALLERGIES:   No Known Allergies    VITAL SIGNS:  /71   Pulse 92   Ht 5' 2\" (1.575 m)   Wt 85.7 kg (189 lb)   BMI 34.57 kg/m²     Current Outpatient Medications   Medication Sig Dispense Refill    lamoTRIgine (LAMICTAL) 200 mg tablet Take 2 Tabs by mouth daily. 60 Tab 2    cloNIDine HCl (CATAPRES) 0.1 mg tablet Take 1 Tab by mouth two (2) times a day. 60 Tab 2    atomoxetine (STRATTERA) 18 mg capsule Take 1 Cap by mouth every morning. 30 Cap 2    atomoxetine (STRATTERA) 40 mg capsule Take 1 Cap by mouth every morning. Indications: Attention-Deficit Hyperactivity Disorder 30 Cap 2       ROS:  Constitutional: Positive for weight   Eyes: Positive for contacts/glasses  ENT: Negative for hearing loss and tinnitus  Respiratory: Negative for cough or wheezing  Neurological: Positive for memory problems  Behavioral/psych: Positive for insomnia, anxiety, depression, negative for SI/HI  Endocrine: Negative for diabetic symptoms including polyuria and polydipsia    MENTAL STATUS EXAMINATION:   Patient is a 33 y.o. female [facility] who looks her stated age. She was observed to be calm and cooperative without any anxiety or depression but reported significant anxiety or depression. Patient appearance is casually dressed with fair hygiene. Speech is regular rate and rhythm, fluent language, and thought process is linear, logical, and goal directed. Reported mood is okay with mood congruent euthymic affect. Patient denies any suicidal ideation, homicidal ideation, and auditory visual hallucination. Not observed to be delusional or paranoid. Insight, judgement, reliability and impulse control is limited to intact. Her cognition is within normal limit. DIAGNOSIS:  Encounter Diagnoses   Name Primary?  Generalized anxiety disorder Yes    History of bipolar disorder     History of ADHD     Cannabis abuse        PLAN:  1. MEDICATION:   1.  Increase lamotrigine 150 mg and 200 mg for a total dose of 350 mg as she is taking for past 10 years with benefit and recently report increasing anxiety or depression since she is not taking atomoxetine. 2.  Provided clonidine 0.1 mg #60 with 2 refills to be taken as needed up to twice a day for anxiety. 3.  Retry atomoxetine 18 mg daily for her diagnosis of ADHD as per her neuropsych testing done in November. She report marked improvement on atomoxetine 10 mg but it only lasted for 4 hours so 18 mg hopefully will last longer. She was provided with psycho education, discussed risk/benefits, and expectations from medication changes. Patient agrees with plan. 2. PSYCHOTHERAPY: Patient was provided with supportive therapy, strongly encourage to seek psychotherapy. 3. MEDICAL CARE: Patient was strongly encourage to take their medical medications and follow up with their PCP on regular basis. Her weight today is 189 pounds and she has gained 5 pounds in past 5 months. 4. SUBSTANCE ABUSE CARE: Patient smokes e-cigarettes daily, denies drinking since October 2017, and denies any other illicit drug abuse except smoking 1 cigarette of marijuana every day. 5. FOLLOW UP:   Follow-up Disposition:  Return in about 6 weeks (around 2/25/2019) for Medication management.

## 2019-01-15 ENCOUNTER — OFFICE VISIT (OUTPATIENT)
Dept: NEUROLOGY | Age: 29
End: 2019-01-15

## 2019-01-15 DIAGNOSIS — F12.90 MARIJUANA USE, EPISODIC: ICD-10-CM

## 2019-01-15 DIAGNOSIS — F32.1 MODERATE MAJOR DEPRESSION (HCC): Primary | ICD-10-CM

## 2019-01-15 DIAGNOSIS — Z91.51 HISTORY OF SUICIDE ATTEMPT: ICD-10-CM

## 2019-01-15 DIAGNOSIS — F41.9 MODERATE ANXIETY: ICD-10-CM

## 2019-01-15 DIAGNOSIS — Z81.1 FAMILY HISTORY OF ALCOHOLISM: ICD-10-CM

## 2019-01-15 DIAGNOSIS — F10.21 HISTORY OF ALCOHOL DEPENDENCE (HCC): ICD-10-CM

## 2019-01-15 DIAGNOSIS — R45.86 MOOD SWINGS: ICD-10-CM

## 2019-01-15 DIAGNOSIS — F90.0 ATTENTION DEFICIT HYPERACTIVITY DISORDER (ADHD), INATTENTIVE TYPE, MODERATE: ICD-10-CM

## 2019-01-15 DIAGNOSIS — Z81.8 FAMILY HISTORY OF DEPRESSION: ICD-10-CM

## 2019-01-15 NOTE — PROGRESS NOTES
Interactive Office feedback session with the patient today. I reviewed the results of the recent Neuropsychological Evaluation, including discussing individual tests as well as patient's areas of neurocognitive strength versus weakness. Education was provided regarding my diagnostic impressions, and we discussed treatment plan/options. This included a lengthy discussion of the following:   From the actual neurocognitive profile, there is strong support for an inattentive form of ADHD. It is a mild to moderate issue. She is also showing problems with high level cognitive organization related abilities. IQ is normal.  Learning and memory abilities are normal.  Executive functioning abilities are normal.   From an emotional functioning standpoint, the patient reported moderate depression and anxiety on two brief self-report questionnaires. Lengthier assessment of personality functioning could not be further interpreted due to a high level of patient response inconsistencies. This is unfortunate, given the history of being diagnosed with Bipolar with possible medication interference when younger.                   The pattern of normal versus abnormal neurocognitive test scores suggests that ADHD is a separate issue from emotional distress. The former is organic and the latter a combination of organic and functional factors. In addition to continued medical care, my recommendations include consideration for a 30-day trial of an appropriate attention related medication, if this is not medically contraindicated. During this trial, the patient should keep track of her response to this medication and provide the prescribing physician with feedback at the end of the month regarding its efficacy. Caution is advised in selecting a medication for attention, given her anxiety. She reports marijuana use as a coping mechanism for her anxiety. She reports being otherwise sober for about a year or more now.   100% sobriety is suggested. I also recommend strongly active engagement in intensive psychotherapy. Monitor for self-harmful thoughts/behaviors and address acutely. Continue follow up with psychiatry for treatment of mood and diagnostic clarification regarding bipolar is also suggested. Unfortunately, without the RUSTAM results, I cannot better clarify other than to say there is no evidence of an ADHD related impulsivity or hyperactivity type concern. Baseline now established. Follow up prn. Clinical correlation is, of course, indicated.                   I will discuss these findings with the patient when she follows up with me in the near future. A follow up Neuropsychological Evaluation is indicated on a prn basis, especially if there are any cognitive and/or emotional changes.       DIAGNOSES: ADHD - Inattentive, Moderate                          Anxiety, Moderate                          Depression, Moderate                          Rule out Bipolar Disorder      I also answered numerous questions related to the clinical findings, including discussing various methods to improve cognition and mood. We discussed the invalid RUSTAM and how I am not convinced that ADHD is the basis for impulsivity, but there is high anxiety here and depression also and we are in a cyclically exacerbating cycle here. She is followed by psychiatry, and has seen him since this evaluation. She reported being pleased with the services provided. She was strongly encouraged to see psychologic counseling. Marijuana use also discussed. She has been recently upped on her medication for attention - at her visit with him yesterday. She \"lost her shit\" with her boss and has not worked since last week,. Lamictal was upped yesterday also. Follow up with [facility] prn.      [id number] Interactive feedback session with patient. 45 minutes with patient, record review, coordination of care. Records provided.   This is an add on code tied directly the patient's psychological evaluation which was previously completed and billed as it was done greater than 10 days ago, per APA and coding guideliens.

## 2019-01-16 ENCOUNTER — TELEPHONE (OUTPATIENT)
Dept: BEHAVIORAL/MENTAL HEALTH CLINIC | Age: 29
End: 2019-01-16

## 2019-01-16 NOTE — TELEPHONE ENCOUNTER
Patient called has been throwing up after taking the Strattera, since Monday. Pt was instructed to stop the medication, but she would like a replacement. Please call her back and pt said she will come in if she needs to.

## 2019-02-05 ENCOUNTER — OFFICE VISIT (OUTPATIENT)
Dept: BEHAVIORAL/MENTAL HEALTH CLINIC | Age: 29
End: 2019-02-05

## 2019-02-05 VITALS
HEIGHT: 64 IN | WEIGHT: 189 LBS | DIASTOLIC BLOOD PRESSURE: 64 MMHG | BODY MASS INDEX: 32.27 KG/M2 | SYSTOLIC BLOOD PRESSURE: 96 MMHG | HEART RATE: 101 BPM

## 2019-02-05 DIAGNOSIS — F12.10 CANNABIS ABUSE: ICD-10-CM

## 2019-02-05 DIAGNOSIS — Z86.59 HISTORY OF BIPOLAR DISORDER: ICD-10-CM

## 2019-02-05 DIAGNOSIS — F90.9 ATTENTION DEFICIT HYPERACTIVITY DISORDER (ADHD), UNSPECIFIED ADHD TYPE: Primary | ICD-10-CM

## 2019-02-05 DIAGNOSIS — F41.1 GENERALIZED ANXIETY DISORDER: ICD-10-CM

## 2019-02-05 RX ORDER — IBUPROFEN 800 MG/1
TABLET ORAL
COMMUNITY
Start: 2018-11-08

## 2019-02-05 RX ORDER — METHYLPHENIDATE HYDROCHLORIDE 5 MG/1
5 TABLET ORAL DAILY
Qty: 30 TAB | Refills: 0 | Status: SHIPPED | OUTPATIENT
Start: 2019-02-05

## 2019-02-05 NOTE — PROGRESS NOTES
Abner Deedee  1990  33 y.o.  female  [place]     Chief Complaint   Patient presents with    Behavioral Problem     Affecting her work, recently diagnosed after having proper neuropsych testing    Medication Problem     Not able to tolerate atomoxetine due to nausea    Addiction problem     He is smoke half a cigarette daily and agreed to quit while on stimulant    Bipolar     Lamotrigine 400 mg helps    Anxiety     Working on coping skill with psychotherapy       [place]:   This is a 33years old   female with past psychiatric history and treatment of bipolar disorder and ADHD who was seen for the first time on June 29, 2018 when she presented to [facility] care as she moved back from [place] 6 months ago and requested medication management. Amanda President had all of her medication bottles with her so medication we conciliation was done and she had some record from her psychiatrist at [facility] since 2009 and also a sheet with all of her medication regimens starting at age 1 both of the documents on the scan in her EMR. She decided to try Strattera 10 mg daily and continue rest of the treatment plan and return in 4 weeks for reevaluation.     Patient was last seen on January 14 when she decided to increase and maximize lamotrigine 400 mg daily, take clonidine 0.1 mg only as needed for anxiety, start getting psychotherapy, and retry Strattera 18 mg daily for her ADHD complaints. She called me a couple of days after her appointment and informed that she is stopped taking Strattera due to side effect of nausea. She completed neuropsych testing done by Dr. Alesha Gonzáles with diagnosis of ADHD.  She presented on time, was alert awake oriented to time person and place and was calm and cooperative, polite, and pleasant during the interview.       Patient reported compliance with Lamictal  400 mg daily, is able to tolerate maximum dose, and report complete satisfaction with her mood disorder to the point where she only required clonidine 0.1 mg approximately 5 times since last seen 3 weeks ago. She was also happy to inform that she restarted psychotherapy at [address] behavioral [facility] by Musa Sol and have seen her 3 times in past 3 weeks and they are working on improving coping skills so she does not have to rely on medication. Patient was provided with support and psychoeducation, we discussed in detail proper treatment of ADHD and she have already tried atomoxetine and not able to tolerate and Adderall which was effective but gives her side effect of bruxism so we decided to try low-dose methylphenidate 5 mg to be taken at 3 PM as she works as a  and  from 4 PM to 1 AM.  She denies any drinking or abusing any other illicit drugs but accepted to smoke half cigarette of marijuana regularly. She lives with her  and has no children.         SUBSTANCE USE HISTORY:   TOBACCO: Started to smoke at age 22 and smoke up to one pack per day till about a year ago when she started to use E cigarettes. ALCOHOL: Patient reported history of severe and heavy drinking about half a gallon a day every day for 5 years between age [age]. She quit drinking October 23, 2017. CANNABIS: Patient reported history of smoking 10 times a day between age [age] but currently only smoke half cigarette daily which helps her with anxiety. She agreed to quit as we have decided to try stimulant for her ADHD. COCAINE: Patient denies. OPIOIDS: She accepted to abuse pain medications but currently denies.      OTHERS: She had tried ecstasy. MEDICAL HISTORY:    has a past medical history of Chronic kidney disease.     ALLERGIES:   No Known Allergies    VITAL SIGNS:  BP 96/64   Pulse (!) 101   Ht 5' 4\" (1.626 m)   Wt 85.7 kg (189 lb)   BMI 32.44 kg/m²     Current Outpatient Medications   Medication Sig Dispense Refill    methylphenidate HCl (RITALIN) 5 mg tablet Take 1 Tab (5 mg total) by mouth daily. Max Daily Amount: 5 mg 30 Tab 0    lamoTRIgine (LAMICTAL) 200 mg tablet Take 2 Tabs by mouth daily. 60 Tab 2    cloNIDine HCl (CATAPRES) 0.1 mg tablet Take 1 Tab by mouth two (2) times a day. 60 Tab 2     mg tablet          ROS:  Constitutional: Negative for fever or pain. Eyes: Positive for glasses  ENT: Negative for hearing loss and tinnitus  Respiratory: Negative for cough or wheezing  Neurological: Positive for memory problems  Behavioral/psych: Positive for insomnia, anxiety, depression, negative for SI/HI  Endocrine: Negative for diabetic symptoms including polyuria and polydipsia     MENTAL STATUS EXAMINATION:   Patient is a 28 y.o. female [place]  who looks her stated age. She was observed to be calm and cooperative without any anxiety or depression but reported significant anxiety or depression. Patient appearance is casually dressed with fair hygiene. Speech is regular rate and rhythm, fluent language, and thought process is linear, logical, and goal directed. Reported mood is okay with mood congruent euthymic affect. Patient denies any suicidal ideation, homicidal ideation, and auditory visual hallucination. Not observed to be delusional or paranoid. Insight, judgement, reliability and impulse control is limited to intact.  Her cognition is within normal limit. DIAGNOSIS:  Encounter Diagnoses   Name Primary?  Attention deficit hyperactivity disorder (ADHD), unspecified ADHD type Yes    Generalized anxiety disorder     Cannabis abuse     History of bipolar disorder        PLAN:  1. MEDICATION:   1. Patient is able to tolerate increasing dose of lamotrigine 400 mg daily and report satisfaction for her mood disorder. She is taking lamotrigine for past 10 years with benefit.     2.    Patient only required clonidine 0.1 mg approximately 5 times in past 3 weeks.      3.     Patient was not able to tolerate atomoxetine due to significant nausea and has not taking for past couple of weeks. She is diagnosed with ADHD with neuropsych testing and we discussed medication management of ADHD and she informed that she took Adderall with benefit but not able to tolerate the side effect of teeth grinding and has taken Ritalin in past so we decided to try Ritalin. 4.  Patient works from 4 PM to 1 AM and report cognitive issues affecting her work so we decided to try methylphenidate 5 mg to be taken at 3 PM and provided her 30 tablets. She will come and  2 months of prescription after 3 weeks if this plan works. She was provided with psycho education, discussed risk/benefits, and expectations from medication changes. Patient agrees with plan. 2. PSYCHOTHERAPY: Patient was provided with supportive therapy, strongly encourage to seek psychotherapy. She started to see Klaus Kim at [facility] 3 weeks ago for weekly psychotherapy where they are working on coping skills. 3. MEDICAL CARE: Patient was strongly encourage to take their medical medications and follow up with their PCP on regular basis. Her weight today is 189 pounds which is a stable since last seen on January 14, her pulse is 101 and blood pressure is 96/64. She has history of chronic kidney disease and obesity. 4. SUBSTANCE ABUSE CARE: Patient smokes 6 cigarettes daily, denies drinking and informed that she quit drinking October 23, 2017 and denies any other illicit drug abuse and has decrease smoking marijuana and now is smoke about half a cigarette daily. She agreed to quit his smoking marijuana as we have decided to try a stimulant and she will need to have clean urine drug screen. 5. FOLLOW UP:   Follow-up Disposition:  Return in about 6 weeks (around 3/19/2019) for Medication management.

## 2019-05-02 ENCOUNTER — OFFICE VISIT (OUTPATIENT)
Dept: BEHAVIORAL/MENTAL HEALTH CLINIC | Age: 29
End: 2019-05-02

## 2019-05-02 VITALS
DIASTOLIC BLOOD PRESSURE: 63 MMHG | BODY MASS INDEX: 32.44 KG/M2 | HEART RATE: 81 BPM | SYSTOLIC BLOOD PRESSURE: 98 MMHG | WEIGHT: 190 LBS | HEIGHT: 64 IN

## 2019-05-02 DIAGNOSIS — F33.41 RECURRENT MAJOR DEPRESSIVE DISORDER, IN PARTIAL REMISSION (HCC): ICD-10-CM

## 2019-05-02 DIAGNOSIS — F41.1 GENERALIZED ANXIETY DISORDER: Primary | ICD-10-CM

## 2019-05-02 DIAGNOSIS — F12.10 CANNABIS ABUSE: ICD-10-CM

## 2019-05-02 DIAGNOSIS — Z86.59 HISTORY OF BIPOLAR DISORDER: ICD-10-CM

## 2019-05-02 DIAGNOSIS — Z86.59 HISTORY OF ADHD: ICD-10-CM

## 2019-05-02 RX ORDER — LAMOTRIGINE 200 MG/1
400 TABLET ORAL DAILY
Qty: 60 TAB | Refills: 2 | Status: SHIPPED | OUTPATIENT
Start: 2019-05-02 | End: 2019-10-02 | Stop reason: SDUPTHER

## 2019-05-02 NOTE — PROGRESS NOTES
Umberto Shoulders  1990  26 y.o.  female  [facility]    Chief Complaint   Patient presents with    Depression     3 out of 10 on the scale of 1-10 where 10 is worst    Anxiety     3 out of 10, reduced significantly with therapy and coping skills    Sleep Apnea     Sleep well    Addiction problem     Considerably decrease smoking marijuana now couple of hits daily instead of half a cigarette daily    Behavioral Problem     Does not require stimulant and requested to discontinue       [place]:   This is a 26years old   female with past psychiatric history and treatment of bipolar disorder and ADHD who was seen for the first time on June 29, 2018 when she presented to \[facility]\"" care as she moved back from [place] 6 months ago and requested medication management. Teddy Allan had all of her medication bottles with her so medication we conciliation was done and she had some record from her psychiatrist at [facility] since 2009 and also a sheet with all of her medication regimens starting at age 1 both of the documents on the scan in her EMR. She decided to try Strattera 10 mg daily and continue rest of the treatment plan and return in 4 weeks for reevaluation.     Patient was last seen on  February 5 when she decided to continue Lamictal 400 mg daily, clonidine 0.1 mg as needed, and try Ritalin 5 mg and return in 4 weeks for reevaluation. She presented on time, was observed to be improved with brighter affect and much calmer and informed that she stopped taking Ritalin and is feeling much better since she started to work in her psychotherapy at [facility].   She report compliance with Lamictal 400 mg daily is able to tolerate maximum dose and report complete satisfaction with her mood and she does not require clonidine and requested to discontinue.        Patient was very happy to inform that she restarted psychotherapy at [address] [facility] by Jesus Manuel Riggs Gunner and they are working on improving coping skills so she does not have to rely on medication. She was provided with support and psychoeducation. She denies any psychosocial changes, lives with her  and work as a  at [employer] now from 10:30-5 instead of working late at night. She report complete satisfaction with her current treatment plan and agreed to come back in 3 months and continue psychotherapy on regular basis which is helping her a lot.      SUBSTANCE USE HISTORY:   TOBACCO: Started to smoke at age 22 and smoke up to one pack per day till about a year ago when she started to use E cigarettes.     ALCOHOL: Patient reported history of severe and heavy drinking about half a gallon a day every day for 5 years between age [age]. She quit drinking October 23, 2017.       CANNABIS: Patient reported history of smoking 10 times a day between age [age] but currently only smoke  couple of hits daily which helps her with anxiety.       COCAINE: Patient denies.     OPIOIDS: She accepted to abuse pain medications but currently denies.       OTHERS: She had tried ecstasy. MEDICAL HISTORY:    has a past medical history of Chronic kidney disease. ALLERGIES:   No Known Allergies    VITAL SIGNS:  BP 98/63   Pulse 81   Ht 5' 4\" (1.626 m)   Wt 86.2 kg (190 lb)   BMI 32.61 kg/m²     Current Outpatient Medications   Medication Sig Dispense Refill    lamoTRIgine (LAMICTAL) 200 mg tablet Take 2 Tabs by mouth daily. Indications: Bipolar Disorder in Remission 60 Tab 2    methylphenidate HCl (RITALIN) 5 mg tablet Take 1 Tab (5 mg total) by mouth daily. Max Daily Amount: 5 mg 30 Tab 0    cloNIDine HCl (CATAPRES) 0.1 mg tablet Take 1 Tab by mouth two (2) times a day. 60 Tab 2     mg tablet          ROS:  Constitutional: Negative for fever or pain.   Eyes: Positive for glasses  ENT: Negative for hearing loss and tinnitus  Respiratory: Negative for cough or wheezing  Neurological: Positive for memory problems  Behavioral/psych: Positive for insomnia, anxiety, depression, negative for SI/HI  Endocrine: Negative for diabetic symptoms including polyuria and polydipsia     MENTAL STATUS EXAMINATION:   Patient is a 29 y.o. female [place]  who looks her stated age.  She was observed to be very much improved and calm and cooperative without any anxiety or depression. She is casually dressed with fair hygiene. Speech is regular rate and rhythm, fluent language, and thought process is linear, logical, and goal directed. Reported mood is okay with mood congruent euthymic affect. Patient denies any suicidal ideation, homicidal ideation, and auditory visual hallucination. Not observed to be delusional or paranoid. Insight, judgement, reliability and impulse control is intact. Her cognition is within normal limit and she is observed to be reliable. DIAGNOSIS:  Encounter Diagnoses   Name Primary?  Generalized anxiety disorder Yes    Recurrent major depressive disorder, in partial remission ([facility] [address]     History of ADHD     History of bipolar disorder     Cannabis abuse        PLAN:  1. MEDICATION:   1.     Mood stabilization: Lamotrigine 400 mg daily and report satisfaction for her mood disorder. She is taking lamotrigine for past 10 years with benefit.     2.      Anxiety: She only required clonidine 0.1 mg approximately 5 times in past 3 months.      3.      ADHD: She is not requiring Ritalin and requested to discontinue as she is doing very well within her psychotherapy where they are working with coping skills. She was provided with psycho education, discussed risk/benefits, and expectations from medication changes. Patient agrees with plan.         2. PSYCHOTHERAPY: Patient was provided with supportive therapy, strongly encourage to seek psychotherapy. She is doing very well within her psychotherapy with Renee Lloyd at [facility] where they are working on coping skills.     3. MEDICAL CARE: Patient was strongly encourage to take their medical medications and follow up with their PCP on regular basis. Her weight today is 190 pounds which is a stable since January 14, her pulse is 81 today it was 101 last time and blood pressure is 98/64. She has history of chronic kidney disease and obesity.     4. SUBSTANCE ABUSE CARE: Patient smokes 5 cigarettes daily, denies drinking and informed that she quit drinking October 23, 2017 and denies any other illicit drug abuse and has decrease smoking marijuana and now smokes couple of hits daily. 5. FOLLOW UP:   Follow-up and Dispositions    · Return in about 3 months (around 8/2/2019) for Medication management.

## 2019-10-02 ENCOUNTER — OFFICE VISIT (OUTPATIENT)
Dept: BEHAVIORAL/MENTAL HEALTH CLINIC | Age: 29
End: 2019-10-02

## 2019-10-02 VITALS
HEART RATE: 91 BPM | SYSTOLIC BLOOD PRESSURE: 100 MMHG | DIASTOLIC BLOOD PRESSURE: 70 MMHG | HEIGHT: 64 IN | BODY MASS INDEX: 32.61 KG/M2

## 2019-10-02 DIAGNOSIS — F33.41 RECURRENT MAJOR DEPRESSIVE DISORDER, IN PARTIAL REMISSION (HCC): Primary | ICD-10-CM

## 2019-10-02 DIAGNOSIS — Z86.59 HISTORY OF BIPOLAR DISORDER: ICD-10-CM

## 2019-10-02 DIAGNOSIS — F41.1 GENERALIZED ANXIETY DISORDER: ICD-10-CM

## 2019-10-02 DIAGNOSIS — Z86.59 HISTORY OF ADHD: ICD-10-CM

## 2019-10-02 RX ORDER — LAMOTRIGINE 200 MG/1
400 TABLET ORAL DAILY
Qty: 60 TAB | Refills: 6 | Status: SHIPPED | OUTPATIENT
Start: 2019-10-02

## 2019-10-02 RX ORDER — CLONIDINE HYDROCHLORIDE 0.1 MG/1
0.1 TABLET ORAL 2 TIMES DAILY
Qty: 60 TAB | Refills: 2 | Status: SHIPPED | OUTPATIENT
Start: 2019-10-02

## 2019-10-02 NOTE — PROGRESS NOTES
Clarisa Pino  1990  26 y.o.  female  [facility]    Chief Complaint   Patient presents with    Depression     Denies    Anxiety     Denies    Insomnia     Sleep well    Addiction problem     Quit his smoking marijuana 5 weeks ago and denies any other drug abuse or alcohol abuse       Eyak:   This is a 26years old   female with past psychiatric history and treatment of bipolar disorder and ADHD who was seen for the first time on June 29, 2018 when she presented to \[facility]\"" care as she moved back from [place] 6 months ago and requested medication management. Eduardo Russell had all of her medication bottles with her so medication we conciliation was done and she had some record from her psychiatrist at [facility] since 2009 and also a sheet with all of her medication regimens starting at age 1 both of the documents on the scan in her EMR. She decided to try Strattera 10 mg daily and continue rest of the treatment plan and return in 4 weeks for reevaluation.     Patient was last seen on May 2, 2019 when she decided to continue Lamictal 400 mg daily, clonidine 0.1 mg as needed, and discontinue Ritalin and return in 3 months for reevaluation but missed her appointment in August. She presented on time, was observed to be improved with brighter affect and much calmer. She report compliance with Lamictal 400 mg daily is able to tolerate maximum dose and report complete satisfaction with her mood and she does not require clonidine and requested to discontinue.        Patient was very happy to inform that she quit his smoking marijuana about 5 weeks ago, is working to decrease her smoking cigarettes, and denies any other substance abuse at this time. She report compliance with psychotherapy at [address] behavioral [facility] by Rhina Foy and they are working on improving coping skills so she does not have to rely on medication. She was provided with support and psychoeducation. She denies any psychosocial changes, lives with her  and work as a  at [employer] She report complete satisfaction with her current treatment plan and agreed to come back in 3 months and continue psychotherapy on regular basis which is helping her a lot.      SUBSTANCE USE HISTORY:   TOBACCO: Started to smoke at age 22 and smoke up to one pack per day till about a year ago when she started to use E cigarettes.     ALCOHOL: Patient reported history of severe and heavy drinking about half a gallon a day every day for 5 years between age [age].  She quit drinking October 23, 2017.       CANNABIS: Patient reported history of smoking 10 times a day between age [age] but currently only smoke  couple of hits daily which helps her with anxiety.       COCAINE: Patient denies.     OPIOIDS: She accepted to abuse pain medications but currently denies.       OTHERS: She had tried ecstasy. MEDICAL HISTORY:    has a past medical history of Chronic kidney disease. ALLERGIES:   No Known Allergies    VITAL SIGNS:  /70 (BP 1 Location: Left arm, BP Patient Position: Sitting)   Pulse 91   Ht 5' 4\" (1.626 m)   BMI 32.61 kg/m²     Current Outpatient Medications   Medication Sig Dispense Refill    lamoTRIgine (LAMICTAL) 200 mg tablet Take 2 Tabs by mouth daily. Indications: Bipolar Disorder in Remission 60 Tab 6    cloNIDine HCl (CATAPRES) 0.1 mg tablet Take 1 Tab by mouth two (2) times a day. Indications: anxiety 60 Tab 2     mg tablet       methylphenidate HCl (RITALIN) 5 mg tablet Take 1 Tab (5 mg total) by mouth daily. Max Daily Amount: 5 mg 30 Tab 0       ROS:  Constitutional: Negative for fever or pain.   Eyes: Positive for glasses  ENT: Negative for hearing loss and tinnitus  Respiratory: Negative for cough or wheezing  Neurological: Positive for memory problems  Behavioral/psych: Positive for insomnia, anxiety, depression, negative for SI/HI  Endocrine: Negative for diabetic symptoms including polyuria and polydipsia     MENTAL STATUS EXAMINATION:   Patient is V 86 y.o. female WHITE OR  who looks her stated age.  She was observed to be very much improved and calm and cooperative without any anxiety or depression. She is casually dressed with fair hygiene. Speech is regular rate and rhythm, fluent language, and thought process is linear, logical, and goal directed. Reported mood is okay with mood congruent euthymic affect. Patient denies any suicidal ideation, homicidal ideation, and auditory visual hallucination. Not observed to be delusional or paranoid. Insight, judgement, reliability and impulse control is intact. Her cognition is within normal limit and she is observed to be reliable. DIAGNOSIS:  Encounter Diagnoses   Name Primary?  Recurrent major depressive disorder, in partial remission (HCC) Yes    Generalized anxiety disorder     History of bipolar disorder     History of ADHD        PLAN:  1. MEDICATION:   1. Mood stabilization: Lamotrigine 400 mg daily and report satisfaction for her mood disorder. She is taking lamotrigine for past 11 years with benefit.     2. Anxiety: She only required clonidine 0.1 mg approximately twice a month in past 6 months.      3.  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 She was provided with psycho education, discussed risk/benefits, and expectations from medication changes. Patient agrees with plan.       2. PSYCHOTHERAPY: Patient was provided with supportive therapy, strongly encourage to seek psychotherapy. She is doing very well within her psychotherapy with Nessa Ford at [facility] where they are working on coping skills.     3. MEDICAL CARE: Patient was strongly encourage to take their medical medications and follow up with their PCP on regular basis.  She refused to weigh in her last weight is 190 pound on May 2, 2019.   Her pulse is 91 and blood pressure is 100/70.  She has history of chronic kidney disease and obesity.     4. SUBSTANCE ABUSE CARE: Patient smokes  10 cigarettes daily, denies drinking and informed that she quit drinking October 23, 2017 and quit smoking marijuana about 5 weeks ago.      5. FOLLOW UP:   Follow-up and Dispositions    · Return in about 6 months (around 4/2/2020) for Medication management.

## 2021-05-03 ENCOUNTER — TRANSCRIBE ORDER (OUTPATIENT)
Dept: SCHEDULING | Age: 31
End: 2021-05-03

## 2021-05-03 DIAGNOSIS — S63.641A RUPTURE OF ULNAR COLLATERAL LIGAMENT OF RIGHT THUMB, INITIAL ENCOUNTER: Primary | ICD-10-CM

## 2021-05-12 ENCOUNTER — APPOINTMENT (OUTPATIENT)
Dept: MRI IMAGING | Age: 31
End: 2021-05-12
Attending: STUDENT IN AN ORGANIZED HEALTH CARE EDUCATION/TRAINING PROGRAM

## 2024-11-27 ENCOUNTER — HOSPITAL ENCOUNTER (EMERGENCY)
Facility: HOSPITAL | Age: 34
Discharge: HOME | End: 2024-11-27
Payer: COMMERCIAL

## 2024-11-27 VITALS
OXYGEN SATURATION: 98 % | DIASTOLIC BLOOD PRESSURE: 78 MMHG | WEIGHT: 160.72 LBS | TEMPERATURE: 97.9 F | RESPIRATION RATE: 12 BRPM | BODY MASS INDEX: 27.44 KG/M2 | SYSTOLIC BLOOD PRESSURE: 118 MMHG | HEART RATE: 71 BPM | HEIGHT: 64 IN

## 2024-11-27 DIAGNOSIS — O21.0 HYPEREMESIS GRAVIDARUM: Primary | ICD-10-CM

## 2024-11-27 LAB
ALBUMIN SERPL-MCNC: 3.6 G/DL (ref 3.5–5)
ALBUMIN/GLOB SERPL: 1.3 (ref 1.1–2.2)
ALP SERPL-CCNC: 46 U/L (ref 45–117)
ALT SERPL-CCNC: 20 U/L (ref 12–78)
ANION GAP SERPL CALC-SCNC: 4 MMOL/L (ref 2–12)
APPEARANCE UR: CLEAR
AST SERPL-CCNC: 25 U/L (ref 15–37)
BACTERIA URNS QL MICRO: ABNORMAL /HPF
BASOPHILS # BLD: 0 K/UL (ref 0–0.1)
BASOPHILS NFR BLD: 0 % (ref 0–1)
BILIRUB SERPL-MCNC: 0.5 MG/DL (ref 0.2–1)
BILIRUB UR QL: NEGATIVE
BUN SERPL-MCNC: 12 MG/DL (ref 6–20)
BUN/CREAT SERPL: 19 (ref 12–20)
CALCIUM SERPL-MCNC: 8.6 MG/DL (ref 8.5–10.1)
CHLORIDE SERPL-SCNC: 109 MMOL/L (ref 97–108)
CO2 SERPL-SCNC: 25 MMOL/L (ref 21–32)
COLOR UR: ABNORMAL
CREAT SERPL-MCNC: 0.62 MG/DL (ref 0.55–1.02)
DIFFERENTIAL METHOD BLD: NORMAL
EOSINOPHIL # BLD: 0.1 K/UL (ref 0–0.4)
EOSINOPHIL NFR BLD: 1 % (ref 0–7)
EPITH CASTS URNS QL MICRO: ABNORMAL /LPF
ERYTHROCYTE [DISTWIDTH] IN BLOOD BY AUTOMATED COUNT: 12.8 % (ref 11.5–14.5)
GLOBULIN SER CALC-MCNC: 2.7 G/DL (ref 2–4)
GLUCOSE SERPL-MCNC: 95 MG/DL (ref 65–100)
GLUCOSE UR STRIP.AUTO-MCNC: NEGATIVE MG/DL
HCG SERPL-ACNC: ABNORMAL MIU/ML (ref 0–6)
HCT VFR BLD AUTO: 42.7 % (ref 35–47)
HGB BLD-MCNC: 14.2 G/DL (ref 11.5–16)
HGB UR QL STRIP: NEGATIVE
HYALINE CASTS URNS QL MICRO: ABNORMAL /LPF (ref 0–2)
IMM GRANULOCYTES # BLD AUTO: 0 K/UL (ref 0–0.04)
IMM GRANULOCYTES NFR BLD AUTO: 0 % (ref 0–0.5)
KETONES UR QL STRIP.AUTO: 40 MG/DL
LEUKOCYTE ESTERASE UR QL STRIP.AUTO: NEGATIVE
LYMPHOCYTES # BLD: 2.1 K/UL (ref 0.8–3.5)
LYMPHOCYTES NFR BLD: 22 % (ref 12–49)
MCH RBC QN AUTO: 31.8 PG (ref 26–34)
MCHC RBC AUTO-ENTMCNC: 33.3 G/DL (ref 30–36.5)
MCV RBC AUTO: 95.5 FL (ref 80–99)
MONOCYTES # BLD: 0.7 K/UL (ref 0–1)
MONOCYTES NFR BLD: 7 % (ref 5–13)
NEUTS SEG # BLD: 6.7 K/UL (ref 1.8–8)
NEUTS SEG NFR BLD: 70 % (ref 32–75)
NITRITE UR QL STRIP.AUTO: NEGATIVE
NRBC # BLD: 0 K/UL (ref 0–0.01)
NRBC BLD-RTO: 0 PER 100 WBC
PH UR STRIP: 8 (ref 5–8)
PLATELET # BLD AUTO: 179 K/UL (ref 150–400)
POTASSIUM SERPL-SCNC: 4.8 MMOL/L (ref 3.5–5.1)
PROT SERPL-MCNC: 6.3 G/DL (ref 6.4–8.2)
PROT UR STRIP-MCNC: NEGATIVE MG/DL
RBC # BLD AUTO: 4.47 M/UL (ref 3.8–5.2)
RBC #/AREA URNS HPF: ABNORMAL /HPF (ref 0–5)
RBC MORPH BLD: NORMAL
SODIUM SERPL-SCNC: 138 MMOL/L (ref 136–145)
SP GR UR REFRACTOMETRY: 1.02
URINE CULTURE IF INDICATED: ABNORMAL
UROBILINOGEN UR QL STRIP.AUTO: 1 EU/DL (ref 0.2–1)
WBC # BLD AUTO: 9.6 K/UL (ref 3.6–11)
WBC URNS QL MICRO: ABNORMAL /HPF (ref 0–4)

## 2024-11-27 PROCEDURE — 36415 COLL VENOUS BLD VENIPUNCTURE: CPT

## 2024-11-27 PROCEDURE — 81001 URINALYSIS AUTO W/SCOPE: CPT

## 2024-11-27 PROCEDURE — 96374 THER/PROPH/DIAG INJ IV PUSH: CPT

## 2024-11-27 PROCEDURE — 6360000002 HC RX W HCPCS

## 2024-11-27 PROCEDURE — 96361 HYDRATE IV INFUSION ADD-ON: CPT

## 2024-11-27 PROCEDURE — 96376 TX/PRO/DX INJ SAME DRUG ADON: CPT

## 2024-11-27 PROCEDURE — 85025 COMPLETE CBC W/AUTO DIFF WBC: CPT

## 2024-11-27 PROCEDURE — 99284 EMERGENCY DEPT VISIT MOD MDM: CPT

## 2024-11-27 PROCEDURE — 80053 COMPREHEN METABOLIC PANEL: CPT

## 2024-11-27 PROCEDURE — 6360000002 HC RX W HCPCS: Performed by: PHYSICIAN ASSISTANT

## 2024-11-27 PROCEDURE — 2580000003 HC RX 258: Performed by: PHYSICIAN ASSISTANT

## 2024-11-27 PROCEDURE — 84702 CHORIONIC GONADOTROPIN TEST: CPT

## 2024-11-27 RX ORDER — ONDANSETRON 4 MG/1
4 TABLET, ORALLY DISINTEGRATING ORAL 3 TIMES DAILY PRN
Qty: 21 TABLET | Refills: 0 | Status: SHIPPED | OUTPATIENT
Start: 2024-11-27

## 2024-11-27 RX ORDER — ONDANSETRON 2 MG/ML
4 INJECTION INTRAMUSCULAR; INTRAVENOUS ONCE
Status: COMPLETED | OUTPATIENT
Start: 2024-11-27 | End: 2024-11-27

## 2024-11-27 RX ORDER — 0.9 % SODIUM CHLORIDE 0.9 %
1000 INTRAVENOUS SOLUTION INTRAVENOUS ONCE
Status: COMPLETED | OUTPATIENT
Start: 2024-11-27 | End: 2024-11-27

## 2024-11-27 RX ORDER — ONDANSETRON 2 MG/ML
INJECTION INTRAMUSCULAR; INTRAVENOUS
Status: COMPLETED
Start: 2024-11-27 | End: 2024-11-27

## 2024-11-27 RX ORDER — ONDANSETRON 2 MG/ML
4 INJECTION INTRAMUSCULAR; INTRAVENOUS EVERY 6 HOURS PRN
Status: DISCONTINUED | OUTPATIENT
Start: 2024-11-27 | End: 2024-11-27 | Stop reason: HOSPADM

## 2024-11-27 RX ADMIN — ONDANSETRON 4 MG: 2 INJECTION INTRAMUSCULAR; INTRAVENOUS at 10:22

## 2024-11-27 RX ADMIN — ONDANSETRON 4 MG: 2 INJECTION INTRAMUSCULAR; INTRAVENOUS at 08:14

## 2024-11-27 RX ADMIN — SODIUM CHLORIDE 1000 ML: 9 INJECTION, SOLUTION INTRAVENOUS at 08:14

## 2024-11-27 ASSESSMENT — PAIN - FUNCTIONAL ASSESSMENT: PAIN_FUNCTIONAL_ASSESSMENT: NONE - DENIES PAIN

## 2024-11-27 NOTE — ED PROVIDER NOTES
[id number]      Phone: [phone]   ondansetron 4 MG disintegrating tablet           DISCONTINUED MEDICATIONS:  Discharge Medication List as of 11/27/2024 11:26 AM        I have seen and evaluated the patient autonomously. My supervision physician was on site and available for consultation if needed.     I am the Primary Clinician of Record.   MARTIR Barber (electronically signed)    (Please note that parts of this dictation were completed with voice recognition software. Quite often unanticipated grammatical, syntax, homophones, and other interpretive errors are inadvertently transcribed by the computer software. Please disregards these errors. Please excuse any errors that have escaped final proofreading.)       Kassandra Cabrera PA  11/27/24 [id number]

## 2024-11-27 NOTE — DISCHARGE INSTRUCTIONS
return to the Emergency Department. We are available 24 hours a day.    Please take your discharge instructions with you when you go to your follow-up appointment.     If a prescription has been provided, please have it filled as soon as possible to prevent a delay in treatment. Read the entire medication instruction sheet provided to you by the pharmacy. If you have any questions or reservations about taking the medication due to side effects or interactions with other medications, please call your primary care physician or contact the ER to speak with the charge nurse.     Please make an appointment with your family doctor or the physician you were referred to for follow-up of this visit as instructed on your discharge paperwork. Return to the ER if you are unable to be seen or if you are unable to be seen in a timely manner.    Should you experience abdominal pain lasting greater than 6 hours, chest pain, difficulty breathing, fever/chills, numbness/tingling, skin changes or other symptoms that concern you, return to the ED sooner. If you feel worse over the next 24 hours, please return to the ED. We are available 24 hours a day. Thank you for trusting us with your care!

## 2024-12-06 ENCOUNTER — HOSPITAL ENCOUNTER (EMERGENCY)
Facility: HOSPITAL | Age: 34
Discharge: HOME | End: 2024-12-06
Attending: STUDENT IN AN ORGANIZED HEALTH CARE EDUCATION/TRAINING PROGRAM
Payer: COMMERCIAL

## 2024-12-06 VITALS
SYSTOLIC BLOOD PRESSURE: 133 MMHG | OXYGEN SATURATION: 99 % | DIASTOLIC BLOOD PRESSURE: 85 MMHG | TEMPERATURE: 98.2 F | RESPIRATION RATE: 18 BRPM | HEART RATE: 69 BPM

## 2024-12-06 DIAGNOSIS — E86.0 DEHYDRATION: ICD-10-CM

## 2024-12-06 DIAGNOSIS — O21.9 NAUSEA AND VOMITING DURING PREGNANCY: Primary | ICD-10-CM

## 2024-12-06 LAB
ALBUMIN SERPL-MCNC: 3.8 G/DL (ref 3.5–5)
ALBUMIN/GLOB SERPL: 1.4 (ref 1.1–2.2)
ALP SERPL-CCNC: 47 U/L (ref 45–117)
ALT SERPL-CCNC: 22 U/L (ref 12–78)
ANION GAP SERPL CALC-SCNC: 6 MMOL/L (ref 2–12)
APPEARANCE UR: ABNORMAL
AST SERPL-CCNC: 15 U/L (ref 15–37)
BACTERIA URNS QL MICRO: NEGATIVE /HPF
BASOPHILS # BLD: 0 K/UL (ref 0–0.1)
BASOPHILS NFR BLD: 0 % (ref 0–1)
BILIRUB SERPL-MCNC: 0.6 MG/DL (ref 0.2–1)
BILIRUB UR QL: NEGATIVE
BUN SERPL-MCNC: 14 MG/DL (ref 6–20)
BUN/CREAT SERPL: 23 (ref 12–20)
CALCIUM SERPL-MCNC: 8.9 MG/DL (ref 8.5–10.1)
CHLORIDE SERPL-SCNC: 106 MMOL/L (ref 97–108)
CO2 SERPL-SCNC: 24 MMOL/L (ref 21–32)
COLOR UR: ABNORMAL
CREAT SERPL-MCNC: 0.6 MG/DL (ref 0.55–1.02)
DIFFERENTIAL METHOD BLD: ABNORMAL
EOSINOPHIL # BLD: 0 K/UL (ref 0–0.4)
EOSINOPHIL NFR BLD: 0 % (ref 0–7)
EPITH CASTS URNS QL MICRO: ABNORMAL /LPF
ERYTHROCYTE [DISTWIDTH] IN BLOOD BY AUTOMATED COUNT: 12.7 % (ref 11.5–14.5)
GLOBULIN SER CALC-MCNC: 2.8 G/DL (ref 2–4)
GLUCOSE SERPL-MCNC: 88 MG/DL (ref 65–100)
GLUCOSE UR STRIP.AUTO-MCNC: 500 MG/DL
HCT VFR BLD AUTO: 39.5 % (ref 35–47)
HGB BLD-MCNC: 13.5 G/DL (ref 11.5–16)
HGB UR QL STRIP: NEGATIVE
HYALINE CASTS URNS QL MICRO: ABNORMAL /LPF (ref 0–2)
IMM GRANULOCYTES # BLD AUTO: 0.1 K/UL (ref 0–0.04)
IMM GRANULOCYTES NFR BLD AUTO: 0 % (ref 0–0.5)
KETONES UR QL STRIP.AUTO: ABNORMAL MG/DL
LEUKOCYTE ESTERASE UR QL STRIP.AUTO: NEGATIVE
LYMPHOCYTES # BLD: 1.8 K/UL (ref 0.8–3.5)
LYMPHOCYTES NFR BLD: 15 % (ref 12–49)
MAGNESIUM SERPL-MCNC: 1.9 MG/DL (ref 1.6–2.4)
MCH RBC QN AUTO: 31.9 PG (ref 26–34)
MCHC RBC AUTO-ENTMCNC: 34.2 G/DL (ref 30–36.5)
MCV RBC AUTO: 93.4 FL (ref 80–99)
MONOCYTES # BLD: 0.7 K/UL (ref 0–1)
MONOCYTES NFR BLD: 6 % (ref 5–13)
NEUTS SEG # BLD: 9.3 K/UL (ref 1.8–8)
NEUTS SEG NFR BLD: 79 % (ref 32–75)
NITRITE UR QL STRIP.AUTO: NEGATIVE
NRBC # BLD: 0 K/UL (ref 0–0.01)
NRBC BLD-RTO: 0 PER 100 WBC
PH UR STRIP: 7 (ref 5–8)
PLATELET # BLD AUTO: 222 K/UL (ref 150–400)
PMV BLD AUTO: 10.2 FL (ref 8.9–12.9)
POTASSIUM SERPL-SCNC: 4 MMOL/L (ref 3.5–5.1)
PROT SERPL-MCNC: 6.6 G/DL (ref 6.4–8.2)
PROT UR STRIP-MCNC: NEGATIVE MG/DL
RBC # BLD AUTO: 4.23 M/UL (ref 3.8–5.2)
RBC #/AREA URNS HPF: ABNORMAL /HPF (ref 0–5)
SODIUM SERPL-SCNC: 136 MMOL/L (ref 136–145)
SP GR UR REFRACTOMETRY: 1.03
URINE CULTURE IF INDICATED: ABNORMAL
UROBILINOGEN UR QL STRIP.AUTO: 0.2 EU/DL (ref 0.2–1)
WBC # BLD AUTO: 11.8 K/UL (ref 3.6–11)
WBC URNS QL MICRO: ABNORMAL /HPF (ref 0–4)

## 2024-12-06 PROCEDURE — 6360000002 HC RX W HCPCS: Performed by: STUDENT IN AN ORGANIZED HEALTH CARE EDUCATION/TRAINING PROGRAM

## 2024-12-06 PROCEDURE — 36415 COLL VENOUS BLD VENIPUNCTURE: CPT

## 2024-12-06 PROCEDURE — 2580000003 HC RX 258: Performed by: STUDENT IN AN ORGANIZED HEALTH CARE EDUCATION/TRAINING PROGRAM

## 2024-12-06 PROCEDURE — 96374 THER/PROPH/DIAG INJ IV PUSH: CPT

## 2024-12-06 PROCEDURE — 99284 EMERGENCY DEPT VISIT MOD MDM: CPT

## 2024-12-06 PROCEDURE — 81001 URINALYSIS AUTO W/SCOPE: CPT

## 2024-12-06 PROCEDURE — 85025 COMPLETE CBC W/AUTO DIFF WBC: CPT

## 2024-12-06 PROCEDURE — 96375 TX/PRO/DX INJ NEW DRUG ADDON: CPT

## 2024-12-06 PROCEDURE — 80053 COMPREHEN METABOLIC PANEL: CPT

## 2024-12-06 PROCEDURE — 83735 ASSAY OF MAGNESIUM: CPT

## 2024-12-06 PROCEDURE — 96361 HYDRATE IV INFUSION ADD-ON: CPT

## 2024-12-06 RX ORDER — ONDANSETRON 2 MG/ML
4 INJECTION INTRAMUSCULAR; INTRAVENOUS ONCE
Status: DISCONTINUED | OUTPATIENT
Start: 2024-12-06 | End: 2024-12-06

## 2024-12-06 RX ORDER — ONDANSETRON 4 MG/1
4 TABLET, ORALLY DISINTEGRATING ORAL 3 TIMES DAILY PRN
Qty: 21 TABLET | Refills: 0 | Status: SHIPPED | OUTPATIENT
Start: 2024-12-06

## 2024-12-06 RX ORDER — DIPHENHYDRAMINE HYDROCHLORIDE 50 MG/ML
12.5 INJECTION INTRAMUSCULAR; INTRAVENOUS
Status: COMPLETED | OUTPATIENT
Start: 2024-12-06 | End: 2024-12-06

## 2024-12-06 RX ORDER — ONDANSETRON 2 MG/ML
4 INJECTION INTRAMUSCULAR; INTRAVENOUS ONCE
Status: COMPLETED | OUTPATIENT
Start: 2024-12-06 | End: 2024-12-06

## 2024-12-06 RX ORDER — METOCLOPRAMIDE HYDROCHLORIDE 5 MG/ML
10 INJECTION INTRAMUSCULAR; INTRAVENOUS ONCE
Status: COMPLETED | OUTPATIENT
Start: 2024-12-06 | End: 2024-12-06

## 2024-12-06 RX ORDER — PROMETHAZINE HYDROCHLORIDE 25 MG/1
25 SUPPOSITORY RECTAL EVERY 8 HOURS PRN
Qty: 10 SUPPOSITORY | Refills: 0 | Status: SHIPPED | OUTPATIENT
Start: 2024-12-06

## 2024-12-06 RX ADMIN — METOCLOPRAMIDE 10 MG: 5 INJECTION, SOLUTION INTRAMUSCULAR; INTRAVENOUS at 18:16

## 2024-12-06 RX ADMIN — DEXTROSE AND SODIUM CHLORIDE 1000 ML: 5; 900 INJECTION, SOLUTION INTRAVENOUS at 15:48

## 2024-12-06 RX ADMIN — DIPHENHYDRAMINE HYDROCHLORIDE 12.5 MG: 50 INJECTION INTRAMUSCULAR; INTRAVENOUS at 18:14

## 2024-12-06 RX ADMIN — ONDANSETRON 4 MG: 2 INJECTION INTRAMUSCULAR; INTRAVENOUS at 15:46

## 2024-12-06 ASSESSMENT — PAIN - FUNCTIONAL ASSESSMENT: PAIN_FUNCTIONAL_ASSESSMENT: NONE - DENIES PAIN

## 2024-12-06 NOTE — ED PROVIDER NOTES
none  Procedures            EMERGENCY DEPARTMENT COURSE and DIFFERENTIAL DIAGNOSIS/MDM   Vitals:    Vitals:    12/06/24 1821 12/06/24 1822 12/06/24 1824 12/06/24 1825   BP:  133/85     Pulse:       Resp:       Temp:       TempSrc:       SpO2: 100% 100% 100% 99%            Patient was given the following medications:  Medications   ondansetron (ZOFRAN) injection 4 mg (4 mg IntraVENous Given 12/6/24 1546)   dextrose 5 % and 0.9 % nacl bolus (1,000 mLs IntraVENous New Bag 12/6/24 1548)   diphenhydrAMINE (BENADRYL) injection 12.5 mg (12.5 mg IntraVENous Given 12/6/24 1814)   metoclopramide (REGLAN) injection 10 mg (10 mg IntraVENous Given 12/6/24 1816)       CONSULTS: (Who and What was discussed)  None      Chronic Conditions: Mood disorder    Social Determinants affecting Dx or Tx: None    Records Reviewed (source and summary of external notes): Nursing Notes and Old Medical Records    CC/HPI Summary, DDx, ED Course, and Reassessment:   MDM  34-year-old female presents for nausea, vomiting, and inability tolerate p.o.  Vital signs reassuring, she is in no distress, not ill-appearing.  Patient is pregnant and reports that the ODT Zofran's are not helping with her nausea.  Appears clinically dry on exam, nauseated but no vomiting here in the department.  Will place an IV for blood work, UA.  Differential includes dehydration, electrolyte disturbance, hyperemesis, nausea in pregnancy, CHRIS, UTI.  Will hydrate with dextrose containing fluids and reevaluate after she has been medicated and fluids have been completed.  We discussed oral rehydration at home as well as return precautions.  She does have follow-up with her OB/GYN this month.         FINAL IMPRESSION     1. Nausea and vomiting during pregnancy    2. Dehydration          DISPOSITION/PLAN   DISPOSITION Discharge - Pending Orders Complete 12/06/2024 06:27:31 PM   DISPOSITION CONDITION Stable           {Disposition (Optional):26922}     PATIENT REFERRED TO:  [place]

## 2025-01-06 ENCOUNTER — HOSPITAL ENCOUNTER (EMERGENCY)
Facility: HOSPITAL | Age: 35
Discharge: HOME | End: 2025-01-06
Attending: STUDENT IN AN ORGANIZED HEALTH CARE EDUCATION/TRAINING PROGRAM
Payer: COMMERCIAL

## 2025-01-06 VITALS
OXYGEN SATURATION: 99 % | RESPIRATION RATE: 18 BRPM | DIASTOLIC BLOOD PRESSURE: 78 MMHG | SYSTOLIC BLOOD PRESSURE: 106 MMHG | HEART RATE: 83 BPM | TEMPERATURE: 98.1 F

## 2025-01-06 DIAGNOSIS — J06.9 VIRAL URI WITH COUGH: Primary | ICD-10-CM

## 2025-01-06 LAB
FLUAV RNA SPEC QL NAA+PROBE: NOT DETECTED
FLUBV RNA SPEC QL NAA+PROBE: NOT DETECTED
S PYO DNA THROAT QL NAA+PROBE: NOT DETECTED
SARS-COV-2 RNA RESP QL NAA+PROBE: NOT DETECTED
SOURCE: NORMAL

## 2025-01-06 PROCEDURE — 87651 STREP A DNA AMP PROBE: CPT

## 2025-01-06 PROCEDURE — 87636 SARSCOV2 & INF A&B AMP PRB: CPT

## 2025-01-06 PROCEDURE — 99283 EMERGENCY DEPT VISIT LOW MDM: CPT

## 2025-01-06 ASSESSMENT — PAIN DESCRIPTION - LOCATION: LOCATION: THROAT

## 2025-01-06 ASSESSMENT — PAIN - FUNCTIONAL ASSESSMENT: PAIN_FUNCTIONAL_ASSESSMENT: 0-10

## 2025-01-06 ASSESSMENT — PAIN SCALES - GENERAL: PAINLEVEL_OUTOF10: 5

## 2025-01-06 NOTE — ED NOTES
RN assumed care of pt, per triage note:     Cold Symptoms (Ambulatory with cc of nasal congestion, sore throat, generalized body aches x3 days. Pt is 12 weeks pregnant, no OB concerns. Pt endorses nausea but nothing different form morning sickness

## 2025-01-08 NOTE — ED PROVIDER NOTES
[facility] EMERGENCY DEPARTMENT  EMERGENCY DEPARTMENT ENCOUNTER       Pt Name: Rabia Lira  MRN: [id number]  Birthdate 1990  Date of evaluation: 2025  Provider: Calin High MD   PCP: Rich Claire MD  Note Started: 12:46 PM EST 25     CHIEF COMPLAINT       Chief Complaint   Patient presents with    Cold Symptoms     Ambulatory with cc of nasal congestion, sore throat, generalized body aches x3 days. Pt is 12 weeks pregnant, no OB concerns. Pt endorses nausea but nothing different form morning sickness.         HISTORY OF PRESENT ILLNESS: 1 or more elements      History From: patient, History limited by: none     Rabia Lira is a 34 y.o. female presenting with congestion, sore throat, cough.  Denies fevers.  She is 12 weeks pregnant.  Denies abdominal pain, vaginal bleeding, discharge, cramping.  Mostly wants to make sure she doesn't have strep or covid or flu.  Taking tylenol with some relief.         Please See MDM for Additional Details of the HPI/PMH  Nursing Notes were all reviewed and agreed with or any disagreements were addressed in the HPI.     REVIEW OF SYSTEMS        Positives and Pertinent negatives as per HPI.    PAST HISTORY     Past Medical History:  Past Medical History:   Diagnosis Date    Chronic kidney disease     UTI, kidney stones       Past Surgical History:  No past surgical history on file.    Family History:  No family history on file.    Social History:  Social History     Tobacco Use    Smoking status: Every Day     Current packs/day: 0.00     Types: Cigarettes     Last attempt to quit: 2012     Years since quittin.1    Smokeless tobacco: Never    Tobacco comments:     Quit smoking: e cig - 1 cartridge a day   Substance Use Topics    Alcohol use: No    Drug use: No       Allergies:  No Known Allergies    CURRENT MEDICATIONS      Discharge Medication List as of 2025  9:43 AM        CONTINUE these medications which have NOT CHANGED

## 2025-03-31 ENCOUNTER — HOSPITAL ENCOUNTER (OUTPATIENT)
Facility: HOSPITAL | Age: 35
Setting detail: OBSERVATION
Discharge: HOME | End: 2025-04-01
Attending: OBSTETRICS & GYNECOLOGY | Admitting: OBSTETRICS & GYNECOLOGY
Payer: COMMERCIAL

## 2025-03-31 PROBLEM — O46.90 VAGINAL BLEEDING IN PREGNANT PATIENT AFTER FIRST TRIMESTER: Status: ACTIVE | Noted: 2025-03-31

## 2025-03-31 LAB
ABO + RH BLD: NORMAL
ALBUMIN SERPL-MCNC: 2.9 G/DL (ref 3.5–5)
ALBUMIN/GLOB SERPL: 0.8 (ref 1.1–2.2)
ALP SERPL-CCNC: 54 U/L (ref 45–117)
ALT SERPL-CCNC: 18 U/L (ref 12–78)
AMPHET UR QL SCN: NEGATIVE
ANION GAP SERPL CALC-SCNC: 8 MMOL/L (ref 2–12)
APTT PPP: 25.9 SEC (ref 22.1–31)
AST SERPL-CCNC: 12 U/L (ref 15–37)
BARBITURATES UR QL SCN: NEGATIVE
BASOPHILS # BLD: 0.02 K/UL (ref 0–0.1)
BASOPHILS NFR BLD: 0.2 % (ref 0–1)
BENZODIAZ UR QL: NEGATIVE
BILIRUB SERPL-MCNC: 0.2 MG/DL (ref 0.2–1)
BLOOD GROUP ANTIBODIES SERPL: NORMAL
BUN SERPL-MCNC: 14 MG/DL (ref 6–20)
BUN/CREAT SERPL: 23 (ref 12–20)
CALCIUM SERPL-MCNC: 8.8 MG/DL (ref 8.5–10.1)
CANNABINOIDS UR QL SCN: POSITIVE
CHLORIDE SERPL-SCNC: 104 MMOL/L (ref 97–108)
CO2 SERPL-SCNC: 26 MMOL/L (ref 21–32)
COCAINE UR QL SCN: NEGATIVE
CREAT SERPL-MCNC: 0.61 MG/DL (ref 0.55–1.02)
DIFFERENTIAL METHOD BLD: ABNORMAL
EOSINOPHIL # BLD: 0.05 K/UL (ref 0–0.4)
EOSINOPHIL NFR BLD: 0.5 % (ref 0–7)
ERYTHROCYTE [DISTWIDTH] IN BLOOD BY AUTOMATED COUNT: 13.2 % (ref 11.5–14.5)
FETAL BLOOD VOL PATIENT KLEIH BETKE: NORMAL ML
FIBRINOGEN PPP-MCNC: 403 MG/DL (ref 200–475)
GLOBULIN SER CALC-MCNC: 3.6 G/DL (ref 2–4)
GLUCOSE SERPL-MCNC: 85 MG/DL (ref 65–100)
HCT VFR BLD AUTO: 34.2 % (ref 35–47)
HGB BLD-MCNC: 11.2 G/DL (ref 11.5–16)
IMM GRANULOCYTES # BLD AUTO: 0.06 K/UL (ref 0–0.04)
IMM GRANULOCYTES NFR BLD AUTO: 0.6 % (ref 0–0.5)
INR PPP: 0.9 (ref 0.9–1.1)
LYMPHOCYTES # BLD: 1.09 K/UL (ref 0.8–3.5)
LYMPHOCYTES NFR BLD: 10.7 % (ref 12–49)
Lab: ABNORMAL
MCH RBC QN AUTO: 31.9 PG (ref 26–34)
MCHC RBC AUTO-ENTMCNC: 32.7 G/DL (ref 30–36.5)
MCV RBC AUTO: 97.4 FL (ref 80–99)
METHADONE UR QL: NEGATIVE
MONOCYTES # BLD: 0.81 K/UL (ref 0–1)
MONOCYTES NFR BLD: 8 % (ref 5–13)
NEUTS SEG # BLD: 8.14 K/UL (ref 1.8–8)
NEUTS SEG NFR BLD: 80 % (ref 32–75)
NRBC # BLD: 0 K/UL (ref 0–0.01)
NRBC BLD-RTO: 0 PER 100 WBC
OPIATES UR QL: NEGATIVE
PCP UR QL: NEGATIVE
PLATELET # BLD AUTO: 231 K/UL (ref 150–400)
PMV BLD AUTO: 10 FL (ref 8.9–12.9)
POTASSIUM SERPL-SCNC: 3.7 MMOL/L (ref 3.5–5.1)
PROT SERPL-MCNC: 6.5 G/DL (ref 6.4–8.2)
PROTHROMBIN TIME: 9.8 SEC (ref 9.2–11.2)
RBC # BLD AUTO: 3.51 M/UL (ref 3.8–5.2)
SODIUM SERPL-SCNC: 138 MMOL/L (ref 136–145)
SPECIMEN EXP DATE BLD: NORMAL
THERAPEUTIC RANGE: NORMAL SECS (ref 58–77)
WBC # BLD AUTO: 10.2 K/UL (ref 3.6–11)

## 2025-03-31 PROCEDURE — G0378 HOSPITAL OBSERVATION PER HR: HCPCS

## 2025-03-31 PROCEDURE — 86900 BLOOD TYPING SEROLOGIC ABO: CPT

## 2025-03-31 PROCEDURE — 80307 DRUG TEST PRSMV CHEM ANLYZR: CPT

## 2025-03-31 PROCEDURE — 85025 COMPLETE CBC W/AUTO DIFF WBC: CPT

## 2025-03-31 PROCEDURE — 6370000000 HC RX 637 (ALT 250 FOR IP): Performed by: OBSTETRICS & GYNECOLOGY

## 2025-03-31 PROCEDURE — 86850 RBC ANTIBODY SCREEN: CPT

## 2025-03-31 PROCEDURE — 80053 COMPREHEN METABOLIC PANEL: CPT

## 2025-03-31 PROCEDURE — 85384 FIBRINOGEN ACTIVITY: CPT

## 2025-03-31 PROCEDURE — 85460 HEMOGLOBIN FETAL: CPT

## 2025-03-31 PROCEDURE — 85730 THROMBOPLASTIN TIME PARTIAL: CPT

## 2025-03-31 PROCEDURE — 36415 COLL VENOUS BLD VENIPUNCTURE: CPT

## 2025-03-31 PROCEDURE — 4500000002 HC ER NO CHARGE

## 2025-03-31 PROCEDURE — 85610 PROTHROMBIN TIME: CPT

## 2025-03-31 PROCEDURE — 86901 BLOOD TYPING SEROLOGIC RH(D): CPT

## 2025-03-31 RX ORDER — BUPROPION HYDROCHLORIDE 150 MG/1
300 TABLET ORAL DAILY
Status: DISCONTINUED | OUTPATIENT
Start: 2025-03-31 | End: 2025-04-01 | Stop reason: HOSPADM

## 2025-03-31 RX ORDER — CETIRIZINE HYDROCHLORIDE 10 MG/1
10 TABLET ORAL DAILY
Status: DISCONTINUED | OUTPATIENT
Start: 2025-03-31 | End: 2025-04-01 | Stop reason: HOSPADM

## 2025-03-31 RX ORDER — ACETAMINOPHEN 500 MG
1000 TABLET ORAL EVERY 8 HOURS PRN
Status: DISCONTINUED | OUTPATIENT
Start: 2025-03-31 | End: 2025-04-01 | Stop reason: HOSPADM

## 2025-03-31 RX ORDER — ONDANSETRON 2 MG/ML
4 INJECTION INTRAMUSCULAR; INTRAVENOUS EVERY 6 HOURS PRN
Status: DISCONTINUED | OUTPATIENT
Start: 2025-03-31 | End: 2025-04-01 | Stop reason: HOSPADM

## 2025-03-31 RX ORDER — SWAB
1 SWAB, NON-MEDICATED MISCELLANEOUS DAILY
Status: DISCONTINUED | OUTPATIENT
Start: 2025-03-31 | End: 2025-04-01 | Stop reason: HOSPADM

## 2025-03-31 RX ORDER — ACETAMINOPHEN 500 MG
1000 TABLET ORAL EVERY 8 HOURS SCHEDULED
Status: DISCONTINUED | OUTPATIENT
Start: 2025-03-31 | End: 2025-03-31

## 2025-03-31 RX ORDER — ONDANSETRON 4 MG/1
4 TABLET, ORALLY DISINTEGRATING ORAL EVERY 8 HOURS PRN
Status: DISCONTINUED | OUTPATIENT
Start: 2025-03-31 | End: 2025-04-01 | Stop reason: HOSPADM

## 2025-03-31 RX ORDER — CALCIUM CARBONATE 500 MG/1
500 TABLET, CHEWABLE ORAL 3 TIMES DAILY PRN
Status: DISCONTINUED | OUTPATIENT
Start: 2025-03-31 | End: 2025-04-01 | Stop reason: HOSPADM

## 2025-03-31 RX ORDER — LAMOTRIGINE 100 MG/1
150 TABLET ORAL 2 TIMES DAILY
Status: DISCONTINUED | OUTPATIENT
Start: 2025-03-31 | End: 2025-04-01 | Stop reason: HOSPADM

## 2025-03-31 RX ADMIN — BUPROPION HYDROCHLORIDE 300 MG: 150 TABLET, EXTENDED RELEASE ORAL at 10:37

## 2025-03-31 RX ADMIN — CETIRIZINE HYDROCHLORIDE 10 MG: 10 TABLET, FILM COATED ORAL at 10:38

## 2025-03-31 RX ADMIN — LAMOTRIGINE 150 MG: 100 TABLET ORAL at 10:38

## 2025-03-31 RX ADMIN — Medication 1 TABLET: at 10:38

## 2025-03-31 RX ADMIN — LAMOTRIGINE 150 MG: 100 TABLET ORAL at 20:44

## 2025-03-31 ASSESSMENT — ENCOUNTER SYMPTOMS
FACIAL SWELLING: 0
ABDOMINAL PAIN: 1
CONSTIPATION: 0
DIARRHEA: 0
SHORTNESS OF BREATH: 0
NAUSEA: 0
BACK PAIN: 0
WHEEZING: 0
COUGH: 0
SORE THROAT: 0
VOMITING: 0

## 2025-03-31 ASSESSMENT — PAIN SCALES - GENERAL: PAINLEVEL_OUTOF10: 0

## 2025-03-31 NOTE — H&P
OB H&P    Patient: Rabia Lira Age: 35 y.o. Sex: female    YOB: 1990 Admit Date: 3/31/2025 PCP: Rich Claire MD   MRN: [id number]  CSN: [id number]     Room: [address] [place] [address] Time Dictated: 4:28 AM        Chief Complaint   Chief Complaint   Patient presents with    Vaginal Bleeding     Patient ambulatory, 1st pregnancy, followed by [facility], patient reports she is 23 weeks pregnant and woke up to go to the bathroom and experienced a gush of blood        History of Present Illness   35 y.o. G1 at 23+6 presents c/o acute onset bright red bleeding about 1.5 hours prior to arrival.  Pt states she woke up to go to the bathroom and experienced a gush of bright red blood with continued lite bleeding.  States some very mild cramping.  Reports good FM.  Denies prior episodes or being told her placenta was in the wrong place.  Denies HA/vision changes/RUQ pain.      Primary OB:  [facility] ([facility])    PNC:  - AMA  - Subchorionic hematoma (3.5x2.1x2.1 mm) on 9 week US (posterior placenta without previa at 20 weeks).  - Anxiety/depression (Wellbutrin and Lamictal)  - Asthma (rare MDI use)    PNL:  A pos  Ab neg  TPA NR  RI  HBsAg neg  HCV Ab NR  HIV NR  GC/Chlam neg/neg    OB History    Para Term  AB Living   1        SAB IAB Ectopic Molar Multiple Live Births              # Outcome Date GA Lbr Huy/2nd Weight Sex Type Anes PTL Lv   1 Current                 Review of Systems   Review of Systems   Constitutional:  Negative for chills and fever.   HENT:  Negative for congestion, facial swelling and sore throat.    Eyes:  Negative for visual disturbance.   Respiratory:  Negative for cough, shortness of breath and wheezing.    Cardiovascular:  Negative for chest pain, palpitations and leg swelling.   Gastrointestinal:  Positive for abdominal pain (very mild cramping). Negative for constipation, diarrhea, nausea and vomiting.   Endocrine: Negative.    Genitourinary:  Positive for vaginal bleeding. Negative for dysuria,

## 2025-03-31 NOTE — ED PROVIDER NOTES
Brief Medical Screening Examination for OB patient at triage    HPI:34 yo F presenting at 24 weeks pregnant complains of vaginal bleeding    ROS: vaginal bleeding    Vitals: Patient Vitals for the past 4 hrs:   Temp Pulse Resp BP SpO2   03/31/25 0314 98.6 °F (37 °C) 92 18 112/81 98 %         Physical Exam:  General appearance: No apparent distress.  Stable vitals.  Afebrile.  Skin exam: Warm and dry.  No pallor.  Neurologic: Alert and oriented.  Abdominal exam: Soft, nontender.  Gravid uterus.    Differential diagnosis: Labor, premature labor, threatened miscarriage, [place] contractions, uterine contractions in pregnancy, premature rupture of membranes.    This patient with a primary obstetrical chief complaint is stable and medically cleared for direct transfer to the OB Labor & Delivery unit for further monitoring and workup.  Medical screening exam is complete.    MD Vincent Barbosa Zachary A, MD  03/31/25 0316

## 2025-04-01 VITALS
HEART RATE: 89 BPM | RESPIRATION RATE: 18 BRPM | OXYGEN SATURATION: 100 % | DIASTOLIC BLOOD PRESSURE: 64 MMHG | SYSTOLIC BLOOD PRESSURE: 114 MMHG | TEMPERATURE: 98 F

## 2025-04-01 LAB
B PERT DNA SPEC QL NAA+PROBE: NOT DETECTED
BORDETELLA PARAPERTUSSIS BY PCR: NOT DETECTED
C PNEUM DNA SPEC QL NAA+PROBE: NOT DETECTED
FLUAV RNA SPEC QL NAA+PROBE: NOT DETECTED
FLUAV SUBTYP SPEC NAA+PROBE: NOT DETECTED
FLUBV RNA SPEC QL NAA+PROBE: NOT DETECTED
FLUBV RNA SPEC QL NAA+PROBE: NOT DETECTED
HADV DNA SPEC QL NAA+PROBE: NOT DETECTED
HCOV 229E RNA SPEC QL NAA+PROBE: NOT DETECTED
HCOV HKU1 RNA SPEC QL NAA+PROBE: NOT DETECTED
HCOV NL63 RNA SPEC QL NAA+PROBE: NOT DETECTED
HCOV OC43 RNA SPEC QL NAA+PROBE: NOT DETECTED
HMPV RNA SPEC QL NAA+PROBE: NOT DETECTED
HPIV1 RNA SPEC QL NAA+PROBE: NOT DETECTED
HPIV2 RNA SPEC QL NAA+PROBE: NOT DETECTED
HPIV3 RNA SPEC QL NAA+PROBE: NOT DETECTED
HPIV4 RNA SPEC QL NAA+PROBE: NOT DETECTED
M PNEUMO DNA SPEC QL NAA+PROBE: NOT DETECTED
RSV RNA SPEC QL NAA+PROBE: NOT DETECTED
RV+EV RNA SPEC QL NAA+PROBE: NOT DETECTED
SARS-COV-2 RNA RESP QL NAA+PROBE: NOT DETECTED
SARS-COV-2 RNA RESP QL NAA+PROBE: NOT DETECTED
SOURCE: NORMAL

## 2025-04-01 PROCEDURE — 87636 SARSCOV2 & INF A&B AMP PRB: CPT

## 2025-04-01 PROCEDURE — 6370000000 HC RX 637 (ALT 250 FOR IP): Performed by: OBSTETRICS & GYNECOLOGY

## 2025-04-01 PROCEDURE — G0378 HOSPITAL OBSERVATION PER HR: HCPCS

## 2025-04-01 PROCEDURE — 0202U NFCT DS 22 TRGT SARS-COV-2: CPT

## 2025-04-01 RX ADMIN — ACETAMINOPHEN 1000 MG: 500 TABLET, FILM COATED ORAL at 08:20

## 2025-04-01 RX ADMIN — Medication 1 TABLET: at 08:20

## 2025-04-01 RX ADMIN — BUPROPION HYDROCHLORIDE 300 MG: 150 TABLET, EXTENDED RELEASE ORAL at 08:20

## 2025-04-01 RX ADMIN — CETIRIZINE HYDROCHLORIDE 10 MG: 10 TABLET, FILM COATED ORAL at 08:20

## 2025-04-01 RX ADMIN — LAMOTRIGINE 150 MG: 100 TABLET ORAL at 08:20

## 2025-04-01 RX ADMIN — ACETAMINOPHEN 1000 MG: 500 TABLET, FILM COATED ORAL at 00:22

## 2025-04-01 ASSESSMENT — PAIN SCALES - GENERAL
PAINLEVEL_OUTOF10: 6
PAINLEVEL_OUTOF10: 5

## 2025-04-01 ASSESSMENT — PAIN DESCRIPTION - DESCRIPTORS
DESCRIPTORS: SORE
DESCRIPTORS: SORE

## 2025-04-01 ASSESSMENT — PAIN DESCRIPTION - LOCATION
LOCATION: THROAT
LOCATION: THROAT;CHEST

## 2025-04-01 ASSESSMENT — PAIN - FUNCTIONAL ASSESSMENT
PAIN_FUNCTIONAL_ASSESSMENT: ACTIVITIES ARE NOT PREVENTED
PAIN_FUNCTIONAL_ASSESSMENT: ACTIVITIES ARE NOT PREVENTED

## 2025-04-01 ASSESSMENT — PAIN DESCRIPTION - PAIN TYPE: TYPE: ACUTE PAIN

## 2025-04-01 ASSESSMENT — PAIN DESCRIPTION - FREQUENCY: FREQUENCY: INTERMITTENT

## 2025-04-01 NOTE — DISCHARGE SUMMARY
Antepartum  Discharge Summary     Patient ID:  Rabia Lira  [id number]  35 y.o.  1990    Admit date: 3/31/2025    Discharge date: 4/1/2025    Admission Diagnoses:    Patient Active Problem List   Diagnosis    Vaginal bleeding in pregnant patient after first trimester       Discharge Diagnoses: There are no discharge diagnoses documented for the most recent discharge.  Patient Active Problem List   Diagnosis    Vaginal bleeding in pregnant patient after first trimester       Procedures for this admission:     Hospital Course: 24w0d with vaginal bleeding of unknown etiology and URI symptoms  Episode of heavy bright red vaginal bleeding 3/30 overnight    She has had no further bleeding for >24 hours.   She has no contractions  KB stain was negative   US yesterday vtx MARION 10. EFW 59%. Posterior placenta no previa.     Plan:   Bleeding - Stable for dc home. F/u in one week with repeat US. Pelvic rest recommended.  URI symptoms - neg flu/covid. O2 sat 100%. Treat symptomatically.    Disposition: Home or self care    Discharged Condition: stable            Patient Instructions:   Current Discharge Medication List        CONTINUE these medications which have NOT CHANGED    Details   WELLBUTRIN  MG extended release tablet Take 1 tablet by mouth daily      lamoTRIgine (LAMICTAL) 150 MG tablet Take 1 tablet by mouth 2 times daily      albuterol sulfate HFA (PROVENTIL;VENTOLIN;PROAIR) 108 (90 Base) MCG/ACT inhaler INHALE TWO PUFFS BY MOUTH EVERY 4 TO 6 HOURS AS NEEDED FOR BREATHING      promethazine (PROMETHEGAN) 25 MG suppository Place 1 suppository rectally every 8 hours as needed for Nausea  Qty: 10 suppository, Refills: 0      ondansetron (ZOFRAN-ODT) 4 MG disintegrating tablet Take 1 tablet by mouth 3 times daily as needed for Nausea or Vomiting  Qty: 21 tablet, Refills: 0           Activity: pelvic rest  Diet: regular diet    Follow-up with No follow-ups on file.     Signed:  Devorah Salvador,

## 2025-04-01 NOTE — PROGRESS NOTES
0325: Assumed care of pt at this time, pt arrived from ED in  with her , Madie, as her visitor.  The pt is , pt of [facility], 23.6 weeks gestation.  She reports that she woke around 0200 and had a large gush of bright red blood, and it has continued since then to continue like a heavy period. She denies LOF other than blood, reports intercourse 3 days ago and positive fetal movement.  Doppler used to confirm FHR in the 140's , unable to trace with EFM and tooc    0355:  Asher DEGROOT notified and aware of the pts arrival and that I was able to get FHR with doppler- accels noted , no decels while listening. VS stable.Orders to try and get EFM and toco on for NST, MD to bedside shortly     0400: Asher DEGROOT at bedside for assessment.  Speculum exam performed, dark blood noted, no bright red blood or current discharge or bleeding from the cervix, MD reports that the cervix is closed .. Discussed plan to observe pt for a few hours and discuss with her MD when they arrive around 78, will start IV and send labs.     0715: bedside shift report given to misty BARAJAS         
4/1/2025        RE: Rabia Lira         [address] [facility] [id number]          To Whom It May Concern,      Due to medical reasons, Rabia Lira   should remain out of work until 4/3/25.        Sincerely,          Ambreen Slade RN    
7:15 AM  Assumed care of pt at this time.   
Ante Partum Progress Note    Rabia Lira  24w0d    Assessment: 24w0d with vaginal bleeding of unknown etiology and URI symptoms  Episode of heavy bright red vaginal bleeding 3/30 overnight    She has had no further bleeding for >24 hours.   She has no contractions  KB stain was negative   US yesterday vtx MARION 10. EFW 59%. Posterior placenta no previa.    Plan:   Bleeding - Stable for dc home. F/u in one week with repeat US. Pelvic rest recommended.  URI symptoms - neg flu/covid. O2 sat 100%. Treat symptomatically.    Orders/Charges: Medium    Patient states she has no new complaints. Bleeding has completely resolved. No old or brown blood either.    Vitals:  /64   Pulse 89   Temp 98 °F (36.7 °C) (Oral)   Resp 18   SpO2 100%   Temp (24hrs), Av.1 °F (36.7 °C), Min:98 °F (36.7 °C), Max:98.1 °F (36.7 °C)            Exam:  Patient without distress.     Abdomen, fundus soft non-tender     Extremities, no redness or tenderness               Additional Exam: Deferred    Labs:     Lab Results   Component Value Date/Time    WBC 10.2 2025 04:48 AM    WBC 11.8 2024 03:52 PM    WBC 9.6 2024 08:06 AM    HGB 11.2 2025 04:48 AM    HGB 13.5 2024 03:52 PM    HGB 14.2 2024 08:06 AM    HCT 34.2 2025 04:48 AM    HCT 39.5 2024 03:52 PM    HCT 42.7 2024 08:06 AM     2025 04:48 AM     2024 03:52 PM     2024 08:06 AM       Recent Results (from the past 24 hours)   COVID-19 & Influenza Combo    Collection Time: 25 10:14 AM    Specimen: Nasopharyngeal   Result Value Ref Range    Source Nasopharyngeal      SARS-CoV-2, PCR Not detected NOTD      Rapid Influenza A By PCR Not detected NOTD      Rapid Influenza B By PCR Not detected NOTD            
U/s reviewed. 58% growth. Nl fluid. Vtx.  Will continue with plan to monitor for bleeding.  K-B pending.  
past 24 hours)   CBC with Auto Differential    Collection Time: 03/31/25  4:48 AM   Result Value Ref Range    WBC 10.2 3.6 - 11.0 K/uL    RBC 3.51 (L) 3.80 - 5.20 M/uL    Hemoglobin 11.2 (L) 11.5 - 16.0 g/dL    Hematocrit 34.2 (L) 35.0 - 47.0 %    MCV 97.4 80.0 - 99.0 FL    MCH 31.9 26.0 - 34.0 PG    MCHC 32.7 30.0 - 36.5 g/dL    RDW 13.2 11.5 - 14.5 %    Platelets 231 150 - 400 K/uL    MPV 10.0 8.9 - 12.9 FL    Nucleated RBCs 0.0 0  WBC    nRBC 0.00 0.00 - 0.01 K/uL    Neutrophils % 80.0 (H) 32.0 - 75.0 %    Lymphocytes % 10.7 (L) 12.0 - 49.0 %    Monocytes % 8.0 5.0 - 13.0 %    Eosinophils % 0.5 0.0 - 7.0 %    Basophils % 0.2 0.0 - 1.0 %    Immature Granulocytes % 0.6 (H) 0.0 - 0.5 %    Neutrophils Absolute 8.14 (H) 1.80 - 8.00 K/UL    Lymphocytes Absolute 1.09 0.80 - 3.50 K/UL    Monocytes Absolute 0.81 0.00 - 1.00 K/UL    Eosinophils Absolute 0.05 0.00 - 0.40 K/UL    Basophils Absolute 0.02 0.00 - 0.10 K/UL    Immature Granulocytes Absolute 0.06 (H) 0.00 - 0.04 K/UL    Differential Type AUTOMATED     Comprehensive metabolic panel    Collection Time: 03/31/25  4:48 AM   Result Value Ref Range    Sodium 138 136 - 145 mmol/L    Potassium 3.7 3.5 - 5.1 mmol/L    Chloride 104 97 - 108 mmol/L    CO2 26 21 - 32 mmol/L    Anion Gap 8 2 - 12 mmol/L    Glucose 85 65 - 100 mg/dL    BUN 14 6 - 20 MG/DL    Creatinine 0.61 0.55 - 1.02 MG/DL    BUN/Creatinine Ratio 23 (H) 12 - 20      Est, Glom Filt Rate >90 >60 ml/min/1.73m2    Calcium 8.8 8.5 - 10.1 MG/DL    Total Bilirubin 0.2 0.2 - 1.0 MG/DL    ALT 18 12 - 78 U/L    AST 12 (L) 15 - 37 U/L    Alk Phosphatase 54 45 - 117 U/L    Total Protein 6.5 6.4 - 8.2 g/dL    Albumin 2.9 (L) 3.5 - 5.0 g/dL    Globulin 3.6 2.0 - 4.0 g/dL    Albumin/Globulin Ratio 0.8 (L) 1.1 - 2.2     Protime-INR    Collection Time: 03/31/25  4:48 AM   Result Value Ref Range    INR 0.9 0.9 - 1.1      Protime 9.8 9.2 - 11.2 sec   APTT    Collection Time: 03/31/25  4:48 AM   Result Value Ref Range

## 2025-04-01 NOTE — DISCHARGE INSTRUCTIONS
2024  Content Version: 14.4  © 2024-2025 [employer].   Care instructions adapted under license by [employer]. If you have questions about a medical condition or this instruction, always ask your healthcare professional. The [employer] disclaims any warranty or liability for your use of this information.       Subchorionic Hematoma: Care Instructions  Overview     A subchorionic hematoma or hemorrhage is bleeding between the wall of the uterus and one of the sacs (chorion) that surrounds the embryo inside the uterus. It is a common cause of bleeding in early pregnancy.  The main symptom is vaginal bleeding. But some people don't have symptoms. They may find out they have a hematoma during an ultrasound test.  In most cases, the bleeding goes away on its own. Most people go on to have a healthy baby. But in some cases, the bleeding is a sign of a miscarriage or other problem with the pregnancy. Your doctor may want to do a follow-up ultrasound.  Follow-up care is a key part of your treatment and safety. Be sure to make and go to all appointments, and call your doctor if you are having problems. It's also a good idea to know your test results and keep a list of the medicines you take.  How can you care for yourself at home?  Keep track of any bleeding, and follow the guidelines for when to call your doctor.  Keep in mind that some bleeding during the first trimester or an abnormal finding on an ultrasound may:  Not cause any problems for you or the baby.  Turn out to be something more serious. But if this happens, it's best to find out early. Then you and your doctor can manage any complications sooner rather than later.  When should you call for help?   Call 911 anytime you think you may need emergency care. For example, call if:    You have sudden, severe pain in your belly or pelvis.     You passed out (lost consciousness).     You have severe vaginal bleeding. This means you are soaking

## 2025-05-29 ENCOUNTER — HOSPITAL ENCOUNTER (OUTPATIENT)
Facility: HOSPITAL | Age: 35
Setting detail: OBSERVATION
Discharge: HOME | End: 2025-05-30
Attending: EMERGENCY MEDICINE | Admitting: OBSTETRICS & GYNECOLOGY
Payer: COMMERCIAL

## 2025-05-29 DIAGNOSIS — O26.893 ABDOMINAL PAIN DURING PREGNANCY IN THIRD TRIMESTER: Primary | ICD-10-CM

## 2025-05-29 DIAGNOSIS — R10.9 ABDOMINAL PAIN DURING PREGNANCY IN THIRD TRIMESTER: Primary | ICD-10-CM

## 2025-05-29 PROCEDURE — 4500000002 HC ER NO CHARGE

## 2025-05-30 ENCOUNTER — APPOINTMENT (OUTPATIENT)
Facility: HOSPITAL | Age: 35
End: 2025-05-30
Payer: COMMERCIAL

## 2025-05-30 VITALS
SYSTOLIC BLOOD PRESSURE: 112 MMHG | RESPIRATION RATE: 18 BRPM | DIASTOLIC BLOOD PRESSURE: 76 MMHG | TEMPERATURE: 98.1 F | HEART RATE: 92 BPM | OXYGEN SATURATION: 100 %

## 2025-05-30 PROBLEM — R10.9 FLANK PAIN: Status: ACTIVE | Noted: 2025-05-30

## 2025-05-30 LAB
ABO + RH BLD: NORMAL
ALBUMIN SERPL-MCNC: 2.6 G/DL (ref 3.5–5)
ALBUMIN/GLOB SERPL: 0.7 (ref 1.1–2.2)
ALP SERPL-CCNC: 87 U/L (ref 45–117)
ALT SERPL-CCNC: 30 U/L (ref 12–78)
ANION GAP SERPL CALC-SCNC: 8 MMOL/L (ref 2–12)
APPEARANCE UR: ABNORMAL
AST SERPL-CCNC: 17 U/L (ref 15–37)
BACTERIA URNS QL MICRO: ABNORMAL /HPF
BILIRUB SERPL-MCNC: 0.5 MG/DL (ref 0.2–1)
BILIRUB UR QL: NEGATIVE
BLOOD GROUP ANTIBODIES SERPL: NORMAL
BUN SERPL-MCNC: 15 MG/DL (ref 6–20)
BUN/CREAT SERPL: 16 (ref 12–20)
CALCIUM SERPL-MCNC: 8.9 MG/DL (ref 8.5–10.1)
CHLORIDE SERPL-SCNC: 106 MMOL/L (ref 97–108)
CO2 SERPL-SCNC: 22 MMOL/L (ref 21–32)
COLOR UR: ABNORMAL
CREAT SERPL-MCNC: 0.91 MG/DL (ref 0.55–1.02)
CREAT UR-MCNC: 171 MG/DL
EPITH CASTS URNS QL MICRO: ABNORMAL /LPF
ERYTHROCYTE [DISTWIDTH] IN BLOOD BY AUTOMATED COUNT: 13.6 % (ref 11.5–14.5)
GLOBULIN SER CALC-MCNC: 4 G/DL (ref 2–4)
GLUCOSE SERPL-MCNC: 89 MG/DL (ref 65–100)
GLUCOSE UR STRIP.AUTO-MCNC: NEGATIVE MG/DL
HCT VFR BLD AUTO: 34.7 % (ref 35–47)
HGB BLD-MCNC: 11.3 G/DL (ref 11.5–16)
HGB UR QL STRIP: ABNORMAL
HYALINE CASTS URNS QL MICRO: ABNORMAL /LPF (ref 0–5)
KETONES UR QL STRIP.AUTO: ABNORMAL MG/DL
LEUKOCYTE ESTERASE UR QL STRIP.AUTO: NEGATIVE
MCH RBC QN AUTO: 31 PG (ref 26–34)
MCHC RBC AUTO-ENTMCNC: 32.6 G/DL (ref 30–36.5)
MCV RBC AUTO: 95.3 FL (ref 80–99)
NITRITE UR QL STRIP.AUTO: NEGATIVE
NRBC # BLD: 0 K/UL (ref 0–0.01)
NRBC BLD-RTO: 0 PER 100 WBC
PH UR STRIP: 6 (ref 5–8)
PLATELET # BLD AUTO: 244 K/UL (ref 150–400)
PMV BLD AUTO: 9.7 FL (ref 8.9–12.9)
POTASSIUM SERPL-SCNC: 3.9 MMOL/L (ref 3.5–5.1)
PROT SERPL-MCNC: 6.6 G/DL (ref 6.4–8.2)
PROT UR STRIP-MCNC: 30 MG/DL
PROT UR-MCNC: 41 MG/DL (ref 0–11.9)
PROT/CREAT UR-RTO: 0.2
RBC # BLD AUTO: 3.64 M/UL (ref 3.8–5.2)
RBC #/AREA URNS HPF: >100 /HPF (ref 0–5)
SODIUM SERPL-SCNC: 136 MMOL/L (ref 136–145)
SP GR UR REFRACTOMETRY: 1.03
SPECIMEN EXP DATE BLD: NORMAL
UROBILINOGEN UR QL STRIP.AUTO: 1 EU/DL (ref 0.2–1)
WBC # BLD AUTO: 14.1 K/UL (ref 3.6–11)
WBC URNS QL MICRO: ABNORMAL /HPF (ref 0–4)

## 2025-05-30 PROCEDURE — 96376 TX/PRO/DX INJ SAME DRUG ADON: CPT

## 2025-05-30 PROCEDURE — 81001 URINALYSIS AUTO W/SCOPE: CPT

## 2025-05-30 PROCEDURE — 6360000002 HC RX W HCPCS: Performed by: OBSTETRICS & GYNECOLOGY

## 2025-05-30 PROCEDURE — G0378 HOSPITAL OBSERVATION PER HR: HCPCS

## 2025-05-30 PROCEDURE — 6370000000 HC RX 637 (ALT 250 FOR IP): Performed by: OBSTETRICS & GYNECOLOGY

## 2025-05-30 PROCEDURE — 76770 US EXAM ABDO BACK WALL COMP: CPT

## 2025-05-30 PROCEDURE — 59025 FETAL NON-STRESS TEST: CPT

## 2025-05-30 PROCEDURE — 80053 COMPREHEN METABOLIC PANEL: CPT

## 2025-05-30 PROCEDURE — 96375 TX/PRO/DX INJ NEW DRUG ADDON: CPT

## 2025-05-30 PROCEDURE — 2580000003 HC RX 258: Performed by: OBSTETRICS & GYNECOLOGY

## 2025-05-30 PROCEDURE — 86901 BLOOD TYPING SEROLOGIC RH(D): CPT

## 2025-05-30 PROCEDURE — 96374 THER/PROPH/DIAG INJ IV PUSH: CPT

## 2025-05-30 PROCEDURE — 84156 ASSAY OF PROTEIN URINE: CPT

## 2025-05-30 PROCEDURE — 86850 RBC ANTIBODY SCREEN: CPT

## 2025-05-30 PROCEDURE — 96361 HYDRATE IV INFUSION ADD-ON: CPT

## 2025-05-30 PROCEDURE — 36415 COLL VENOUS BLD VENIPUNCTURE: CPT

## 2025-05-30 PROCEDURE — 86900 BLOOD TYPING SEROLOGIC ABO: CPT

## 2025-05-30 PROCEDURE — 87086 URINE CULTURE/COLONY COUNT: CPT

## 2025-05-30 PROCEDURE — 82570 ASSAY OF URINE CREATININE: CPT

## 2025-05-30 PROCEDURE — 85027 COMPLETE CBC AUTOMATED: CPT

## 2025-05-30 RX ORDER — SODIUM CHLORIDE, SODIUM LACTATE, POTASSIUM CHLORIDE, CALCIUM CHLORIDE 600; 310; 30; 20 MG/100ML; MG/100ML; MG/100ML; MG/100ML
INJECTION, SOLUTION INTRAVENOUS CONTINUOUS
Status: DISCONTINUED | OUTPATIENT
Start: 2025-05-30 | End: 2025-05-30 | Stop reason: HOSPADM

## 2025-05-30 RX ORDER — ONDANSETRON 4 MG/1
4 TABLET, ORALLY DISINTEGRATING ORAL EVERY 6 HOURS PRN
Status: DISCONTINUED | OUTPATIENT
Start: 2025-05-30 | End: 2025-05-30 | Stop reason: HOSPADM

## 2025-05-30 RX ORDER — HYDROMORPHONE HYDROCHLORIDE 1 MG/ML
0.5 INJECTION, SOLUTION INTRAMUSCULAR; INTRAVENOUS; SUBCUTANEOUS ONCE
Status: COMPLETED | OUTPATIENT
Start: 2025-05-30 | End: 2025-05-30

## 2025-05-30 RX ORDER — SODIUM CHLORIDE 9 MG/ML
25 INJECTION, SOLUTION INTRAVENOUS PRN
Status: DISCONTINUED | OUTPATIENT
Start: 2025-05-30 | End: 2025-05-30 | Stop reason: HOSPADM

## 2025-05-30 RX ORDER — HYDROMORPHONE HYDROCHLORIDE 1 MG/ML
1 INJECTION, SOLUTION INTRAMUSCULAR; INTRAVENOUS; SUBCUTANEOUS
Status: DISCONTINUED | OUTPATIENT
Start: 2025-05-30 | End: 2025-05-30 | Stop reason: HOSPADM

## 2025-05-30 RX ORDER — SODIUM CHLORIDE, SODIUM LACTATE, POTASSIUM CHLORIDE, AND CALCIUM CHLORIDE .6; .31; .03; .02 G/100ML; G/100ML; G/100ML; G/100ML
500 INJECTION, SOLUTION INTRAVENOUS PRN
Status: DISCONTINUED | OUTPATIENT
Start: 2025-05-30 | End: 2025-05-30 | Stop reason: HOSPADM

## 2025-05-30 RX ORDER — SODIUM CHLORIDE 0.9 % (FLUSH) 0.9 %
5-40 SYRINGE (ML) INJECTION PRN
Status: DISCONTINUED | OUTPATIENT
Start: 2025-05-30 | End: 2025-05-30 | Stop reason: HOSPADM

## 2025-05-30 RX ORDER — ONDANSETRON 2 MG/ML
4 INJECTION INTRAMUSCULAR; INTRAVENOUS EVERY 6 HOURS PRN
Status: DISCONTINUED | OUTPATIENT
Start: 2025-05-30 | End: 2025-05-30 | Stop reason: HOSPADM

## 2025-05-30 RX ORDER — ACETAMINOPHEN 325 MG/1
650 TABLET ORAL EVERY 4 HOURS PRN
Status: DISCONTINUED | OUTPATIENT
Start: 2025-05-30 | End: 2025-05-30 | Stop reason: HOSPADM

## 2025-05-30 RX ORDER — SODIUM CHLORIDE 0.9 % (FLUSH) 0.9 %
5-40 SYRINGE (ML) INJECTION EVERY 12 HOURS SCHEDULED
Status: DISCONTINUED | OUTPATIENT
Start: 2025-05-30 | End: 2025-05-30 | Stop reason: HOSPADM

## 2025-05-30 RX ORDER — HYDROMORPHONE HYDROCHLORIDE 1 MG/ML
0.5 INJECTION, SOLUTION INTRAMUSCULAR; INTRAVENOUS; SUBCUTANEOUS
Status: DISCONTINUED | OUTPATIENT
Start: 2025-05-30 | End: 2025-05-30

## 2025-05-30 RX ADMIN — HYDROMORPHONE HYDROCHLORIDE 0.5 MG: 1 INJECTION, SOLUTION INTRAMUSCULAR; INTRAVENOUS; SUBCUTANEOUS at 02:03

## 2025-05-30 RX ADMIN — HYDROMORPHONE HYDROCHLORIDE 0.5 MG: 1 INJECTION, SOLUTION INTRAMUSCULAR; INTRAVENOUS; SUBCUTANEOUS at 00:29

## 2025-05-30 RX ADMIN — ONDANSETRON 4 MG: 2 INJECTION, SOLUTION INTRAMUSCULAR; INTRAVENOUS at 01:02

## 2025-05-30 RX ADMIN — SODIUM CHLORIDE, SODIUM LACTATE, POTASSIUM CHLORIDE, AND CALCIUM CHLORIDE: .6; .31; .03; .02 INJECTION, SOLUTION INTRAVENOUS at 01:07

## 2025-05-30 RX ADMIN — ACETAMINOPHEN 650 MG: 325 TABLET ORAL at 08:01

## 2025-05-30 RX ADMIN — HYDROMORPHONE HYDROCHLORIDE 1 MG: 1 INJECTION, SOLUTION INTRAMUSCULAR; INTRAVENOUS; SUBCUTANEOUS at 05:23

## 2025-05-30 ASSESSMENT — PAIN DESCRIPTION - LOCATION
LOCATION: ABDOMEN
LOCATION: BACK;ABDOMEN

## 2025-05-30 ASSESSMENT — PAIN DESCRIPTION - DESCRIPTORS: DESCRIPTORS: ACHING;DISCOMFORT

## 2025-05-30 ASSESSMENT — PAIN DESCRIPTION - ORIENTATION
ORIENTATION: LOWER
ORIENTATION: LEFT
ORIENTATION: LEFT

## 2025-05-30 ASSESSMENT — PAIN SCALES - GENERAL
PAINLEVEL_OUTOF10: 7
PAINLEVEL_OUTOF10: 5
PAINLEVEL_OUTOF10: 9

## 2025-05-30 NOTE — PROGRESS NOTES
Call to Dr. Menjivar at 0155. PT c/o  Dilaudid not working, pain score currently at an 8. Per Dr. Menjivar, RN can administer 0.5mg of Dilaudid at this time. RN voiced understanding. No other orders at this time.

## 2025-05-30 NOTE — PROGRESS NOTES
0818-RN called u/s requesting a time as order was placed STAT. She stated transport was placed at 0640 and she would reach out to them.     0847- pt off unit for stat ultrasound.

## 2025-05-30 NOTE — PROGRESS NOTES
NST:    Baseline: 130    Variability: Moderate    Accelerations: Two 15 x 15 accels in a ten minute window    Decelerations: None    Contractions: None    20 minutes continuous monitoring not obtained secondary to patient discomfort and position changes.    RNST, Cat 1    This test was preformed under my supervision and interpreted by me.    Lionel Menjivar M.D.   coughing up blood

## 2025-05-30 NOTE — H&P
Department of Obstetrics and Gynecology  Attending Obstetrics History and Physical        CHIEF COMPLAINT:  left flank pain    HISTORY OF PRESENT ILLNESS:      The patient is a 35 y.o.  1 parity 0 at 32 and 2/7 weeks.  Patient presents with a chief complaint as above and is being admitted for suspected renal lithiasis. Patient has 4 hr h/o of constant, waxing and waning left flank pain. No hematuria but h/o kidney stones. No vaginal bleeding, contractions, or generalized abdominal pain. Pt notes good FM. ANC c/b asthma, anxiety/dep, AMA, kidney stones. [facility] EMR reviewed from 24 through 25.    DATES:  Estimated Due Date:  25    PRENATAL CARE:    Provider:  [facility]    Blood Type/Rh:  A+  Antibody Screen:  neg  Rubella:  Immune  RPR:  neg  Hepatitis B Surface Antigen: neg  HIV:  neg  Gonorrhea:  neg  Chlamydia:  neg  1 hour Glucose Tolerance Test:  121  Group B Strep:  NA      PAST OB HISTORY        Depression:  Yes       Post-partum depression:  No      Diabetes:  No      Gestational Diabetes:  No      Thyroid Disease:  No      Chronic HTN:  No      Gestation HTN:  No      Pre-eclampsia:  No      Seizure disorder:  No      Asthma:  Yes MDI prn      Clotting disorder:  No      :  No      Tubal ligation:  No      D & C:  No      Cerclage:  No      LEEP:  No      Myomectomy:  No    OB History    Para Term  AB Living   1 0 0 0 0 0   SAB IAB Ectopic Molar Multiple Live Births   0 0 0 0 0 0      # Outcome Date GA Lbr Huy/2nd Weight Sex Type Anes PTL Lv   1 Current                Past Medical History:        Diagnosis Date    Anxiety and depression     Asthma     Chronic kidney disease     UTI, kidney stones    HPV in female     Subchorionic hematoma     per 20 week US     Past Surgical History:        Procedure Laterality Date    COLPOSCOPY      HAND SURGERY      right hand    MOUTH SURGERY      WISDOM TOOTH EXTRACTION       Social History:    Non-smoker, No ETOH or drugs  Family

## 2025-05-30 NOTE — DISCHARGE SUMMARY
Antepartum  Discharge Summary     Patient ID:  Rabia Lira  [id number]  35 y.o.  1990    Admit date: 5/29/2025    Discharge date: 5/30/2025    Admission Diagnoses:    Patient Active Problem List   Diagnosis    Vaginal bleeding in pregnant patient after first trimester    Flank pain       Discharge Diagnoses: There are no discharge diagnoses documented for the most recent discharge.  Patient Active Problem List   Diagnosis    Vaginal bleeding in pregnant patient after first trimester    Flank pain       Procedures for this admission: none    Hospital Course: Patient was admitted at 32 3/7 wls with left flank pain and clinical picture c/w kidney stone. She has a h/o kidney stones and the pain felt the same. No fevers.   5.29 Renal US- IMPRESSION: Nonobstructing bilateral nephrolithiasis. No hydronephrosis.   She was treated with IVFs and Dilaudid for pain and she felt she passed the stone as her pain  resolved.   5.30 Renal US- normal with mild L hydonephrosis, no stones seen    Disposition: Home or self care    Discharged Condition: stable            Patient Instructions:   Current Discharge Medication List        CONTINUE these medications which have NOT CHANGED    Details   albuterol sulfate HFA (PROVENTIL;VENTOLIN;PROAIR) 108 (90 Base) MCG/ACT inhaler INHALE TWO PUFFS BY MOUTH EVERY 4 TO 6 HOURS AS NEEDED FOR BREATHING      WELLBUTRIN  MG extended release tablet Take 1 tablet by mouth daily      lamoTRIgine (LAMICTAL) 150 MG tablet Take 1 tablet by mouth 2 times daily      promethazine (PROMETHEGAN) 25 MG suppository Place 1 suppository rectally every 8 hours as needed for Nausea  Qty: 10 suppository, Refills: 0      ondansetron (ZOFRAN-ODT) 4 MG disintegrating tablet Take 1 tablet by mouth 3 times daily as needed for Nausea or Vomiting  Qty: 21 tablet, Refills: 0           Activity: activity as tolerated  Diet: regular diet    Follow-up with No follow-ups on file.     Signed:  Kiara Lomas

## 2025-05-30 NOTE — ED PROVIDER NOTES
Brief Medical Screening Examination for OB patient at triage    HPI: 34yo female  currently 32 weeks pregnant complains of abdominal pain, nausea, and vomiting.  Began on 930.  Located on the left side.  No dysuria, hematuria, loss of fluid, vaginal bleeding.  Followed by [facility].    ROS: + Abdominal pain. + Nausea and vomiting    Vitals: Patient Vitals for the past 4 hrs:   Temp Pulse Resp BP SpO2   25 2300 97.7 °F (36.5 °C) (!) 102 22 138/72 100 %         Physical Exam:  General appearance: Appears uncomfortable, mildly tachycardic.    Afebrile.  Skin exam: Warm and dry.  No pallor.  Neurologic: Alert and oriented.  Abdominal exam: Soft, tender over the lower abdomen primarily left lower quadrant.  Gravid uterus.    Differential diagnosis: Labor, premature labor, threatened miscarriage, [place] contractions, uterine contractions in pregnancy, premature rupture of membranes.    This patient with a primary obstetrical chief complaint is stable and medically cleared for direct transfer to the OB Labor & Delivery unit for further monitoring and workup.  Medical screening exam is complete.    MD Jeannine Szymanski Katharine S, MD  25 [phone]

## 2025-05-30 NOTE — PROGRESS NOTES
2344 05/29/2025 Dr. Menjivar notified of pt arrival, report given by RN  1201 Dr. Menjivar in room  0011 SVE 0// performed by Dr. Menjivar, RN present    0041- pt currently refusing continuous fetal monitoring due to L flank pain

## 2025-05-30 NOTE — PROGRESS NOTES
Ante Partum Progress Note    Rabia Lira  32w3d    Assessment: 32w3d   Kidney stone  Patient was admitted early this am with left flank pain and clinical picture c/w kidney stone. She has a h/o kidney stones and the pain felt the same. No fevers.   5.29 Renal US- IMPRESSION: Nonobstructing bilateral nephrolithiasis. No hydronephrosis.   She was treated with IVFs and Dilaudid for pain and she now thinks she has passed the stone as her pain has resolved.   5.30- Renal US- normal with mild L hydonephrosis, no stones seen    Plan:  Discharge home with the following: Activity: ad travis Diet: as tolerated. Follow up in office:  as scheduled  She will call with any recurrent pain or other urinary sxs    Orders/Charges: High    Patient states she does not have headache , abdominal pain  , contractions, right upper quadrant pain  , vaginal bleeding , swelling, vaginal leaking of fluid , and her flank pain has resolved. Baby is active.     Vitals:  /76   Pulse 92   Temp 98.1 °F (36.7 °C) (Oral)   Resp 18   SpO2 100%   Temp (24hrs), Av °F (36.7 °C), Min:97.7 °F (36.5 °C), Max:98.4 °F (36.9 °C)      Last 24hr Input/Output:    Intake/Output Summary (Last 24 hours) at 2025 1123  Last data filed at 2025 0804  Gross per 24 hour   Intake --   Output 500 ml   Net -500 ml        Non stress test:  Reactive  An NST was performed and was reactive. The baseline FHR was 120s. Moderate baseline  variability was noted. Accelerations of sufficient amplitude and duration were noted.  There were no decelerations noted.     No data found. No data found.     Uterine Activity: None     Exam:  Patient without distress.     Abdomen, fundus soft non-tender, no CVA tenderness     Extremities, no redness or tenderness               Additional Exam:     Labs:     Lab Results   Component Value Date/Time    WBC 14.1 2025 12:33 AM    WBC 10.2 2025 04:48 AM    WBC 11.8 2024 03:52 PM    WBC 9.6 2024  PROTEIN/CREAT RATIO URINE RAN 0.2     Urinalysis with Microscopic    Collection Time: 05/30/25 12:33 AM   Result Value Ref Range    Color, UA YELLOW/STRAW      Appearance CLOUDY (A) CLEAR      Specific Gravity, UA 1.026      pH, Urine 6.0 5.0 - 8.0      Protein, UA 30 (A) NEG mg/dL    Glucose, Ur Negative NEG mg/dL    Ketones, Urine TRACE (A) NEG mg/dL    Bilirubin, Urine Negative NEG      Blood, Urine LARGE (A) NEG      Urobilinogen, Urine 1.0 0.2 - 1.0 EU/dL    Nitrite, Urine Negative NEG      Leukocyte Esterase, Urine Negative NEG      WBC, UA 5-10 0 - 4 /hpf    RBC, UA >100 (H) 0 - 5 /hpf    Epithelial Cells, UA MANY (A) FEW /lpf    BACTERIA, URINE 3+ (A) NEG /hpf    Hyaline Casts, UA 0-2 0 - 5 /lpf

## 2025-05-31 LAB
BACTERIA SPEC CULT: NORMAL
CC UR VC: NORMAL
SERVICE CMNT-IMP: NORMAL

## 2025-06-14 ENCOUNTER — HOSPITAL ENCOUNTER (OUTPATIENT)
Facility: HOSPITAL | Age: 35
Discharge: HOME | End: 2025-06-14
Attending: EMERGENCY MEDICINE | Admitting: OBSTETRICS & GYNECOLOGY
Payer: COMMERCIAL

## 2025-06-14 VITALS
TEMPERATURE: 98.1 F | DIASTOLIC BLOOD PRESSURE: 76 MMHG | SYSTOLIC BLOOD PRESSURE: 109 MMHG | HEART RATE: 109 BPM | RESPIRATION RATE: 16 BRPM | OXYGEN SATURATION: 98 %

## 2025-06-14 DIAGNOSIS — O46.90 VAGINAL BLEEDING IN PREGNANCY: Primary | ICD-10-CM

## 2025-06-14 LAB
CLUE CELLS VAG QL WET PREP: NORMAL
T VAGINALIS VAG QL WET PREP: NORMAL
YEAST WET PREP: NORMAL

## 2025-06-14 PROCEDURE — 87210 SMEAR WET MOUNT SALINE/INK: CPT

## 2025-06-14 PROCEDURE — 99285 EMERGENCY DEPT VISIT HI MDM: CPT

## 2025-06-14 NOTE — ED PROVIDER NOTES
EMERGENCY DEPARTMENT HISTORY AND PHYSICAL EXAM    ----------------------------------------------------------------------------------------  Please note that this dictation was completed with [employer], the [email or web address] voice recognition software.  Quite often unanticipated grammatical, syntax, homophones, and other interpretive errors are inadvertently transcribed by the computer software.  Please disregard these errors.  Please excuse any errors that have escaped final proofreading.  ---------------------------------------------------------------------------------------      Date: 2025  Patient Name: Rabia Lira    History of Presenting Illness     Chief Complaint   Patient presents with    Vaginal Bleeding     Pt arrives w cc of vaginal bleeding since 3am. Pt is 34 weeks pregnant with her first baby. Pt is having dull cramping in her lower abdomen. Pt had a uterine wall hematoma, was admitted here for 2 days in April. Pt sees [facility]. Pt called OB and they told her to come in       History Provided By: Patient    HPI:Rabia Lira is a 35 y.o. female,  at 36weeks, followed by Dr. Nobles for Prenatal care, who presents ambulatory to the ED with c/o vaginal bleeding started earlier this evening with some abdominal cramping    Pt specifically denies recent fever, congestion, chest pain, shortness of breath, nausea, vomiting, diarrhea, dysuria, or urinary frequency.      No current facility-administered medications for this encounter.     Current Outpatient Medications   Medication Sig Dispense Refill    Prenatal Vit-Fe Fumarate-FA (PRENATAL PO) Take by mouth      Ascorbic Acid (CHEWABLE VITAMIN C PO) Take by mouth      albuterol sulfate HFA (PROVENTIL;VENTOLIN;PROAIR) 108 (90 Base) MCG/ACT inhaler INHALE TWO PUFFS BY MOUTH EVERY 4 TO 6 HOURS AS NEEDED FOR BREATHING      WELLBUTRIN  MG extended release tablet Take 400 mg by mouth daily      lamoTRIgine (LAMICTAL) 150 MG tablet Take 1 tablet by

## 2025-06-14 NOTE — PROCEDURES
NST Procedure Note    Patient: Rabia Lira Age: 35 y.o. Sex: female    YOB: 1990 Admit Date: 6/14/2025 PCP: Rich Claire MD   MRN: [id number]  CSN: [id number]  LOS: 0      Estimated Gestational Age: 34w4d     Indication for NST: vaginal bleeding    15 x 15    Fetal Vital Signs:  Mode: External  Fetal Heart Rate: 130 baseline  Fetal Activity: Present  Variability: Moderate 6-25 bmp  Accelerations: Yes  Decelerations: No  FHR Interpretations: Category I    Non-Stress Test: reactive    Uterine Activity:  Mode: External  Frequency (min): none     Signed by:Tara Sterling MD  6/14/2025 5:56 AM

## 2025-06-14 NOTE — PROGRESS NOTES
Pt arrived with SO for c/o vaginal bleeding. Pt assisted to restroom then back to bed.  EFM & TOCO applied. Pt stated they did have intercourse last night and she noticed some bleeding when she \"woke up this morning after wiping her self.\"  Pt denies clots. Triage assessment performed.  POC advised; verbalized understanding.     0444 MD made aware of pts arrival and status    0500 MD present. SVE cl/thi/high with speculum performed    0609 MD present giving discharge orders.  Discharge instructions reviewed and given to pt and SO; verbalized understanding.    0613 Pt discharged off the unit via ambulation in stable condition.

## 2025-06-14 NOTE — H&P
OB H&P    Patient: Rabia Lira Age: 35 y.o. Sex: female    YOB: 1990 Admit Date: 2025 PCP: Rich Calire MD   MRN: [id number]  CSN: [id number]     Room: [address] [place] [address] Time Dictated: 5:37 AM        Chief Complaint   Chief Complaint   Patient presents with    Vaginal Bleeding     Pt arrives w cc of vaginal bleeding since 3am. Pt is 34 weeks pregnant with her first baby. Pt is having dull cramping in her lower abdomen. Pt had a uterine wall hematoma, was admitted here for 2 days in April. Pt sees [facility]. Pt called OB and they told her to come in        History of Present Illness   35 y.o. G1 at 34+4 presents c/o vaginal bleeding that started around 3 this morning and lower abdominal cramping like menstrual cramps.  Reports good FM.  No LOF.  Denies HA/vision changes/RUQ pain.  Denies N/V/F/C.  Reports having intercourse last evening but nothing seemed amiss.     Primary OB:  [facility] ([facility])    PNC:  - AMA  - Subchorionic hematoma (3.5x2.1x2.1 mm) on 9 week US (posterior placenta without previa at 20 weeks).  - Anxiety/depression (Wellbutrin and Lamictal)  - Asthma (rare MDI use)  - Nephrolithiasis at 32 weeks    PNL:  A pos  Ab neg  TPA NR  RI  HBsAg neg  HCV Ab NR  HIV NR  GC/Chlam neg/neg  1 hr     OB History    Para Term  AB Living   1 0 0 0 0 0   SAB IAB Ectopic Molar Multiple Live Births   0 0 0 0 0 0      # Outcome Date GA Lbr Huy/2nd Weight Sex Type Anes PTL Lv   1 Current                 Review of Systems   Review of Systems   Constitutional:  Negative for chills and fever.   HENT:  Negative for congestion, facial swelling and sore throat.    Eyes:  Negative for visual disturbance.   Respiratory:  Negative for cough, shortness of breath and wheezing.    Cardiovascular:  Negative for chest pain, palpitations and leg swelling.   Gastrointestinal:  Positive for abdominal pain (lower abdominal cramping). Negative for constipation, diarrhea, nausea and vomiting.

## 2025-06-27 ENCOUNTER — TELEPHONE (OUTPATIENT)
Facility: CLINIC | Age: 35
End: 2025-06-27

## 2025-06-27 NOTE — TELEPHONE ENCOUNTER
----- Message from Mirta MCKEON sent at 6/26/2025  1:39 PM EDT -----  Regarding: [facility] Appointment Request  [facility] Appointment Request    Patient needs appointment for [facility] Appointment Type: New to Provider.    Patient Requested Dates(s): Before July 22, 2025   Patient Requested Time: Flexible   Provider Name:  Eve Ingram MD    Reason for Appointment Request: New Patient - Available appointments did not meet patient need. Patient have an a[pointment on August 05, 2025 for  new to provider, transferring from [facility] and requesting for a pediatrician PCP for her baby and she's requesting for soonest appointment.  --------------------------------------------------------------------------------------------------------------------------    Relationship to Patient: Self     Call Back Information: OK to leave message on voicemail  Preferred Call Back Number: Phone [phone]